# Patient Record
Sex: MALE | Race: BLACK OR AFRICAN AMERICAN | NOT HISPANIC OR LATINO | Employment: FULL TIME | URBAN - METROPOLITAN AREA
[De-identification: names, ages, dates, MRNs, and addresses within clinical notes are randomized per-mention and may not be internally consistent; named-entity substitution may affect disease eponyms.]

---

## 2018-04-20 ENCOUNTER — OFFICE VISIT (OUTPATIENT)
Dept: FAMILY MEDICINE CLINIC | Facility: CLINIC | Age: 28
End: 2018-04-20

## 2018-04-20 VITALS
TEMPERATURE: 97.9 F | WEIGHT: 315 LBS | HEIGHT: 69 IN | SYSTOLIC BLOOD PRESSURE: 110 MMHG | OXYGEN SATURATION: 98 % | HEART RATE: 66 BPM | DIASTOLIC BLOOD PRESSURE: 84 MMHG | BODY MASS INDEX: 46.65 KG/M2

## 2018-04-20 DIAGNOSIS — M75.82 ROTATOR CUFF TENDINITIS, LEFT: Primary | ICD-10-CM

## 2018-04-20 PROCEDURE — 99213 OFFICE O/P EST LOW 20 MIN: CPT | Performed by: FAMILY MEDICINE

## 2018-04-20 RX ORDER — CYCLOBENZAPRINE HCL 10 MG
10 TABLET ORAL
Qty: 10 TABLET | Refills: 1 | Status: SHIPPED | OUTPATIENT
Start: 2018-04-20 | End: 2018-05-24

## 2018-04-20 RX ORDER — MELOXICAM 15 MG/1
15 TABLET ORAL DAILY
Qty: 10 TABLET | Refills: 1 | Status: SHIPPED | OUTPATIENT
Start: 2018-04-20 | End: 2020-02-19

## 2018-04-20 NOTE — PROGRESS NOTES
"Subjective   Murphy Gutierrez is a 27 y.o. male.     History of Present Illness   The patient is here today with c/o left shoulder pain.    States he went to the ER and was diagnosed with a pulled muscle. Took all the antiinflammatory medication and muscle relaxers given and still having pain with lifting and driving.  Denies any chest pain or shortness of breath.        The following portions of the patient's history were reviewed and updated as appropriate: allergies, current medications, past social history and problem list.    Review of Systems   Respiratory: Negative for shortness of breath.    Cardiovascular: Negative for chest pain.   Musculoskeletal: Positive for arthralgias (left shoulder).       Objective   /84   Pulse 66   Temp 97.9 °F (36.6 °C)   Ht 175.3 cm (69\")   Wt (!) 165 kg (363 lb)   SpO2 98%   BMI 53.61 kg/m²   Physical Exam   Constitutional: He appears well-developed and well-nourished.   Musculoskeletal:   Left shoulder exam reveals tenderness at superior aspect of shoulder.  Pain with movement against resistance.  Normal range of motion without pain passively.   Nursing note and vitals reviewed.      Assessment/Plan   Problem List Items Addressed This Visit     None      Visit Diagnoses     Rotator cuff tendinitis, left    -  Primary              Drink plenty fluids.    Rx for Cyclobenzaprine 10 mg at bed time #10+1.  Rx for Meloxicam 15 mg daily #10+1.    Refer to Physical therapy.    Follow up in 4 weeks.                Scribed for Dr Mauricio Ugalde by Imani Arriaga CMA.          I, Mauricio Ugalde MD, personally performed the services described in this documentation, as scribed by Imani Arriaga in my presence, and is both accurate and complete.      "

## 2018-04-25 ENCOUNTER — TELEPHONE (OUTPATIENT)
Dept: FAMILY MEDICINE CLINIC | Facility: CLINIC | Age: 28
End: 2018-04-25

## 2018-04-25 NOTE — TELEPHONE ENCOUNTER
----- Message from Enrique Rush Rep sent at 4/25/2018  8:04 AM EDT -----  Regarding: SHOULDER PAIN  Contact: [phone]  PT IS HAVING PAIN IN HIS SHOULDER AND WAS SEEN FOR IT LAST WEEK. HE WOULD LIKE TO KNOW IF IT IS POSSIBLE TO GET A STATEMENT LIMITING HIS WORK AND HE CAN HAVE AN RX CALLED IN?

## 2018-05-24 ENCOUNTER — OFFICE VISIT (OUTPATIENT)
Dept: FAMILY MEDICINE CLINIC | Facility: CLINIC | Age: 28
End: 2018-05-24

## 2018-05-24 VITALS
WEIGHT: 315 LBS | OXYGEN SATURATION: 98 % | HEIGHT: 69 IN | TEMPERATURE: 97.2 F | BODY MASS INDEX: 46.65 KG/M2 | SYSTOLIC BLOOD PRESSURE: 152 MMHG | DIASTOLIC BLOOD PRESSURE: 88 MMHG | HEART RATE: 75 BPM

## 2018-05-24 DIAGNOSIS — S46.812D TRAPEZIUS STRAIN, LEFT, SUBSEQUENT ENCOUNTER: Primary | ICD-10-CM

## 2018-05-24 PROCEDURE — 99213 OFFICE O/P EST LOW 20 MIN: CPT | Performed by: FAMILY MEDICINE

## 2018-05-24 PROCEDURE — 20552 NJX 1/MLT TRIGGER POINT 1/2: CPT | Performed by: FAMILY MEDICINE

## 2018-05-24 RX ORDER — METHYLPREDNISOLONE ACETATE 40 MG/ML
40 INJECTION, SUSPENSION INTRA-ARTICULAR; INTRALESIONAL; INTRAMUSCULAR; SOFT TISSUE ONCE
Status: COMPLETED | OUTPATIENT
Start: 2018-05-24 | End: 2018-05-24

## 2018-05-24 RX ORDER — DIAZEPAM 5 MG/1
TABLET ORAL
Qty: 15 TABLET | Refills: 0
Start: 2018-05-24 | End: 2020-02-19

## 2018-05-24 RX ADMIN — METHYLPREDNISOLONE ACETATE 40 MG: 40 INJECTION, SUSPENSION INTRA-ARTICULAR; INTRALESIONAL; INTRAMUSCULAR; SOFT TISSUE at 18:15

## 2018-05-24 NOTE — PROGRESS NOTES
"Subjective   Murphy Gutierrez is a 27 y.o. male.     History of Present Illness   The patient is here for a follow up on Left shoulder pain.    States his shoulder pain has gotten worse. No some stiffness in his neck and radiates into left chest. Worse with picking up items , driving and pushing up with left arm. Has had some numbness and tingling.  He is using the Cyclobenzaprine at bedtime and the Meloxicam 15 mg in the mornings.  No able to do PT due to no insurance.    Denies any chest pain or shortness of breath.      The following portions of the patient's history were reviewed and updated as appropriate: allergies, current medications, past social history and problem list.    Review of Systems   Respiratory: Negative for shortness of breath.    Cardiovascular: Negative for chest pain.   Musculoskeletal: Positive for myalgias (left shoulder and arm.).       Objective   /88 (BP Location: Left arm, Patient Position: Sitting, Cuff Size: Adult)   Pulse 75   Temp 97.2 °F (36.2 °C) (Oral)   Ht 175.3 cm (69\")   Wt (!) 166 kg (365 lb)   SpO2 98%   BMI 53.90 kg/m²   Physical Exam   Constitutional: He appears well-developed and well-nourished.   Musculoskeletal:   There is tenderness and tightness of the left trapezius muscle.  There is a trigger point centrally and this is injected.  Tolerated.   Nursing note and vitals reviewed.      Assessment/Plan   Problem List Items Addressed This Visit     None      Visit Diagnoses     Trapezius strain, left, subsequent encounter    -  Primary        Inject Trigger Point, 1 or 2  Date/Time: 5/24/2018 4:30 PM  Performed by: SYMONE KHANNA  Authorized by: SYMONE KHANNA   Consent: Verbal consent obtained.  Consent given by: patient  Patient understanding: patient states understanding of the procedure being performed  Patient consent: the patient's understanding of the procedure matches consent given  Patient identity confirmed: verbally with patient  Local anesthesia " used: yes    Anesthesia:  Local anesthesia used: yes  Local Anesthetic: bupivacaine 0.5% without epinephrine    Sedation:  Patient sedated: no  Patient tolerance: Patient tolerated the procedure well with no immediate complications                Drink plenty fluids.    Continue medications as doing.    Written Rx for Diazepam 5 mg 1 early evening and 2 at bedtime. #15+0.    Written Rx for Cyclobenzaprine 10 mg to use after the Diazepam therapy.    Follow up as needed.            Scribed for Dr Mauricio Ugalde by Imani Arriaga CMA.          I, Mauricio Ugalde MD, personally performed the services described in this documentation, as scribed by Imani Arriaga in my presence, and is both accurate and complete.

## 2020-01-23 ENCOUNTER — OFFICE VISIT (OUTPATIENT)
Dept: FAMILY MEDICINE CLINIC | Facility: CLINIC | Age: 30
End: 2020-01-23

## 2020-01-23 VITALS
BODY MASS INDEX: 46.65 KG/M2 | RESPIRATION RATE: 16 BRPM | OXYGEN SATURATION: 94 % | SYSTOLIC BLOOD PRESSURE: 134 MMHG | WEIGHT: 315 LBS | HEART RATE: 99 BPM | DIASTOLIC BLOOD PRESSURE: 94 MMHG | HEIGHT: 69 IN | TEMPERATURE: 98.7 F

## 2020-01-23 DIAGNOSIS — J10.1 INFLUENZA A: Primary | ICD-10-CM

## 2020-01-23 LAB
EXPIRATION DATE: ABNORMAL
FLUAV AG NPH QL: POSITIVE
FLUBV AG NPH QL: NEGATIVE
INTERNAL CONTROL: ABNORMAL
Lab: ABNORMAL

## 2020-01-23 PROCEDURE — 99213 OFFICE O/P EST LOW 20 MIN: CPT | Performed by: FAMILY MEDICINE

## 2020-01-23 PROCEDURE — 87804 INFLUENZA ASSAY W/OPTIC: CPT | Performed by: FAMILY MEDICINE

## 2020-01-23 RX ORDER — OSELTAMIVIR PHOSPHATE 75 MG/1
75 CAPSULE ORAL 2 TIMES DAILY
Qty: 10 CAPSULE | Refills: 0 | Status: SHIPPED | OUTPATIENT
Start: 2020-01-23 | End: 2020-02-19

## 2020-01-23 NOTE — PROGRESS NOTES
"Subjective   Murphy Gutierrez is a 29 y.o. male seen today for Chills; Headache; and Cough (productive ).     Cough   This is a new problem. The current episode started in the past 7 days. The cough is productive of sputum. Associated symptoms include chills, a fever and headaches. Pertinent negatives include no chest pain or shortness of breath. The symptoms are aggravated by lying down and exercise. He has tried rest and OTC cough suppressant for the symptoms. The treatment provided no relief.        The following portions of the patient's history were reviewed and updated as appropriate: allergies, current medications, past social history and problem list.    Review of Systems   Constitutional: Positive for chills and fever.   Respiratory: Positive for cough. Negative for shortness of breath.    Cardiovascular: Negative for chest pain.   Neurological: Positive for headaches.       Objective   /94   Pulse 99   Temp 98.7 °F (37.1 °C)   Resp 16   Ht 175.3 cm (69\")   Wt (!) 192 kg (422 lb 9.6 oz)   SpO2 94%   BMI 62.41 kg/m²   Physical Exam   Constitutional: He appears well-developed and well-nourished.   Cardiovascular: Normal rate and regular rhythm.   Pulmonary/Chest: Effort normal and breath sounds normal.   Nursing note and vitals reviewed.      Assessment/Plan   Problem List Items Addressed This Visit     None      Visit Diagnoses     Influenza A    -  Primary    Relevant Medications    oseltamivir (TAMIFLU) 75 MG capsule    Other Relevant Orders    POCT Influenza A/B (Completed)              Drink plenty fluids.    Rx for Tamiflu 75 mg twice a day #10+0.    Follow up as needed.          Scribed for Dr Mauricio Ugalde by Imani Arriaga CMA.          I, Mauricio Ugalde MD, personally performed the services described in this documentation, as scribed by Imani Arriaga in my presence, and is both accurate and complete.        (Please note that portions of this note were completed with a voice recognition " program. Efforts were made to edit the dictations,but occasionally words are mis transcribed.)

## 2020-02-19 ENCOUNTER — OFFICE VISIT (OUTPATIENT)
Dept: FAMILY MEDICINE CLINIC | Facility: CLINIC | Age: 30
End: 2020-02-19

## 2020-02-19 VITALS
RESPIRATION RATE: 22 BRPM | HEIGHT: 69 IN | SYSTOLIC BLOOD PRESSURE: 140 MMHG | HEART RATE: 99 BPM | WEIGHT: 315 LBS | OXYGEN SATURATION: 98 % | TEMPERATURE: 97.5 F | DIASTOLIC BLOOD PRESSURE: 90 MMHG | BODY MASS INDEX: 46.65 KG/M2

## 2020-02-19 DIAGNOSIS — M10.9 GOUTY ARTHRITIS OF GREAT TOE: Primary | ICD-10-CM

## 2020-02-19 DIAGNOSIS — I10 ESSENTIAL HYPERTENSION: ICD-10-CM

## 2020-02-19 LAB
ALBUMIN SERPL-MCNC: 3.9 G/DL (ref 3.5–5.2)
ALBUMIN/GLOB SERPL: 1.1 G/DL
ALP SERPL-CCNC: 73 U/L (ref 39–117)
ALT SERPL W P-5'-P-CCNC: 24 U/L (ref 1–41)
ANION GAP SERPL CALCULATED.3IONS-SCNC: 11.9 MMOL/L (ref 5–15)
AST SERPL-CCNC: 15 U/L (ref 1–40)
BASOPHILS # BLD AUTO: 0.06 10*3/MM3 (ref 0–0.2)
BASOPHILS NFR BLD AUTO: 0.4 % (ref 0–1.5)
BILIRUB SERPL-MCNC: 0.3 MG/DL (ref 0.2–1.2)
BUN BLD-MCNC: 13 MG/DL (ref 6–20)
BUN/CREAT SERPL: 16.3 (ref 7–25)
CALCIUM SPEC-SCNC: 9.5 MG/DL (ref 8.6–10.5)
CHLORIDE SERPL-SCNC: 98 MMOL/L (ref 98–107)
CHOLEST SERPL-MCNC: 165 MG/DL (ref 0–200)
CO2 SERPL-SCNC: 28.1 MMOL/L (ref 22–29)
CREAT BLD-MCNC: 0.8 MG/DL (ref 0.76–1.27)
DEPRECATED RDW RBC AUTO: 36 FL (ref 37–54)
EOSINOPHIL # BLD AUTO: 0.37 10*3/MM3 (ref 0–0.4)
EOSINOPHIL NFR BLD AUTO: 2.4 % (ref 0.3–6.2)
ERYTHROCYTE [DISTWIDTH] IN BLOOD BY AUTOMATED COUNT: 13.6 % (ref 12.3–15.4)
GFR SERPL CREATININE-BSD FRML MDRD: 138 ML/MIN/1.73
GLOBULIN UR ELPH-MCNC: 3.7 GM/DL
GLUCOSE BLD-MCNC: 127 MG/DL (ref 65–99)
HCT VFR BLD AUTO: 42.2 % (ref 37.5–51)
HDLC SERPL-MCNC: 47 MG/DL (ref 40–60)
HGB BLD-MCNC: 12.6 G/DL (ref 13–17.7)
IMM GRANULOCYTES # BLD AUTO: 0.1 10*3/MM3 (ref 0–0.05)
IMM GRANULOCYTES NFR BLD AUTO: 0.7 % (ref 0–0.5)
LDLC SERPL CALC-MCNC: 103 MG/DL (ref 0–100)
LDLC/HDLC SERPL: 2.18 {RATIO}
LYMPHOCYTES # BLD AUTO: 2.32 10*3/MM3 (ref 0.7–3.1)
LYMPHOCYTES NFR BLD AUTO: 15.1 % (ref 19.6–45.3)
MCH RBC QN AUTO: 22.5 PG (ref 26.6–33)
MCHC RBC AUTO-ENTMCNC: 29.9 G/DL (ref 31.5–35.7)
MCV RBC AUTO: 75.4 FL (ref 79–97)
MONOCYTES # BLD AUTO: 0.75 10*3/MM3 (ref 0.1–0.9)
MONOCYTES NFR BLD AUTO: 4.9 % (ref 5–12)
NEUTROPHILS # BLD AUTO: 11.73 10*3/MM3 (ref 1.7–7)
NEUTROPHILS NFR BLD AUTO: 76.5 % (ref 42.7–76)
NRBC BLD AUTO-RTO: 0 /100 WBC (ref 0–0.2)
PLATELET # BLD AUTO: 285 10*3/MM3 (ref 140–450)
PMV BLD AUTO: 10.8 FL (ref 6–12)
POTASSIUM BLD-SCNC: 4.8 MMOL/L (ref 3.5–5.2)
PROT SERPL-MCNC: 7.6 G/DL (ref 6–8.5)
RBC # BLD AUTO: 5.6 10*6/MM3 (ref 4.14–5.8)
SODIUM BLD-SCNC: 138 MMOL/L (ref 136–145)
TRIGL SERPL-MCNC: 77 MG/DL (ref 0–150)
TSH SERPL DL<=0.05 MIU/L-ACNC: 4.05 UIU/ML (ref 0.27–4.2)
URATE SERPL-MCNC: 7.6 MG/DL (ref 3.4–7)
VLDLC SERPL-MCNC: 15.4 MG/DL (ref 5–40)
WBC NRBC COR # BLD: 15.33 10*3/MM3 (ref 3.4–10.8)

## 2020-02-19 PROCEDURE — 85025 COMPLETE CBC W/AUTO DIFF WBC: CPT | Performed by: FAMILY MEDICINE

## 2020-02-19 PROCEDURE — 99213 OFFICE O/P EST LOW 20 MIN: CPT | Performed by: FAMILY MEDICINE

## 2020-02-19 PROCEDURE — 80053 COMPREHEN METABOLIC PANEL: CPT | Performed by: FAMILY MEDICINE

## 2020-02-19 PROCEDURE — 80061 LIPID PANEL: CPT | Performed by: FAMILY MEDICINE

## 2020-02-19 PROCEDURE — 84443 ASSAY THYROID STIM HORMONE: CPT | Performed by: FAMILY MEDICINE

## 2020-02-19 PROCEDURE — 84550 ASSAY OF BLOOD/URIC ACID: CPT | Performed by: FAMILY MEDICINE

## 2020-02-19 RX ORDER — INDOMETHACIN 50 MG/1
50 CAPSULE ORAL 3 TIMES DAILY PRN
Qty: 10 CAPSULE | Refills: 1 | Status: SHIPPED | OUTPATIENT
Start: 2020-02-19

## 2020-02-19 RX ORDER — AMLODIPINE BESYLATE 2.5 MG/1
2.5 TABLET ORAL DAILY
Qty: 30 TABLET | Refills: 5 | Status: SHIPPED | OUTPATIENT
Start: 2020-02-19

## 2020-02-19 NOTE — PROGRESS NOTES
"Subjective   Murphy Gutierrez is a 29 y.o. male seen today for Toe Pain.     History of Present Illness   The patient is here with c/o left great toe pain and swelling.    States the pain is about a 6 on the pain score. Feels like the bone needs to pop.  Some redness and swelling. Hurts worse with walking and palpation.  Denies any fever, or chills.     BP today is 140/90.  Denies any chest pain or shortness of breath.  Also c/o a lot of headaches. About 1-2 times a week.      The following portions of the patient's history were reviewed and updated as appropriate: allergies, current medications, past social history and problem list.    Review of Systems   Respiratory: Negative for shortness of breath.    Cardiovascular: Negative for chest pain.   Musculoskeletal: Positive for arthralgias (left great toe).       Objective   /90   Pulse 99   Temp 97.5 °F (36.4 °C)   Resp 22   Ht 175.3 cm (69\")   Wt (!) 192 kg (423 lb)   SpO2 98%   BMI 62.47 kg/m²   Physical Exam   Constitutional: He appears well-developed and well-nourished.   Nursing note and vitals reviewed.      Assessment/Plan   Problem List Items Addressed This Visit     None      Visit Diagnoses     Gouty arthritis of great toe    -  Primary    Relevant Medications    indomethacin (INDOCIN) 50 MG capsule    Other Relevant Orders    Uric Acid (Completed)    CBC & Differential (Completed)    CBC Auto Differential (Completed)    Essential hypertension        Relevant Medications    amLODIPine (NORVASC) 2.5 MG tablet    Other Relevant Orders    CBC & Differential (Completed)    Comprehensive Metabolic Panel (Completed)    Lipid Panel (Completed)    TSH (Completed)    CBC Auto Differential (Completed)              Drink plenty fluids.    Avoid rich fatty meats.    Rx for Indomethacin 50 mg 3 times a day as needed #10+1.    Check a CBC,CMP,Lipids,Uric acid, and TSH. Report results by letter.    With regards to lab results, patient is instructed to call the " office if they have not received test results within 2 weeks time.    Follow up in 4 weeks for a physical.              Scribed for Dr Mauricio Ugalde by Imani Arriaga CMA.          I, Mauricio Ugalde MD, personally performed the services described in this documentation, as scribed by Imani Arriaga in my presence, and is both accurate and complete.        (Please note that portions of this note were completed with a voice recognition program. Efforts were made to edit the dictations,but occasionally words are mis transcribed.)

## 2020-05-18 ENCOUNTER — TELEPHONE (OUTPATIENT)
Dept: FAMILY MEDICINE CLINIC | Facility: CLINIC | Age: 30
End: 2020-05-18

## 2020-05-18 DIAGNOSIS — H65.00 ACUTE SEROUS OTITIS MEDIA, RECURRENCE NOT SPECIFIED, UNSPECIFIED LATERALITY: Primary | ICD-10-CM

## 2020-05-18 RX ORDER — AMOXICILLIN AND CLAVULANATE POTASSIUM 875; 125 MG/1; MG/1
1 TABLET, FILM COATED ORAL 2 TIMES DAILY
Qty: 20 TABLET | Refills: 0 | Status: SHIPPED | OUTPATIENT
Start: 2020-05-18

## 2020-05-18 NOTE — TELEPHONE ENCOUNTER
Please advise/Jeanie    I spoke to the patient's mother.  He is having ear pain.  They are using drops for external otitis but the pain persists.  He also has a draining abscess of his lower back.    We will place him on Augmentin 875 mg twice a day for 10 days.  Return to see me day after tomorrow.

## 2020-05-18 NOTE — TELEPHONE ENCOUNTER
PATIENTS MOM CALLED AND IS REQUESTING A CALL BACK IN REGARDS TO HER SONS EAR INFECTION. TO SEE IF SOMETHING COULD BE DONE BEFORE THE 20TH WHEN HIS APPOINTMENT IS.    PATIENTS MOM CALL BACK [phone]    PLEASE ADVISE

## 2020-05-20 ENCOUNTER — TELEPHONE (OUTPATIENT)
Dept: FAMILY MEDICINE CLINIC | Facility: CLINIC | Age: 30
End: 2020-05-20

## 2020-05-20 ENCOUNTER — OFFICE VISIT (OUTPATIENT)
Dept: FAMILY MEDICINE CLINIC | Facility: CLINIC | Age: 30
End: 2020-05-20

## 2020-05-20 VITALS
SYSTOLIC BLOOD PRESSURE: 132 MMHG | WEIGHT: 315 LBS | HEART RATE: 88 BPM | BODY MASS INDEX: 63.68 KG/M2 | DIASTOLIC BLOOD PRESSURE: 78 MMHG | TEMPERATURE: 96.3 F | RESPIRATION RATE: 20 BRPM

## 2020-05-20 DIAGNOSIS — H60.501 ACUTE OTITIS EXTERNA OF RIGHT EAR, UNSPECIFIED TYPE: Primary | ICD-10-CM

## 2020-05-20 DIAGNOSIS — H60.311 ACUTE DIFFUSE OTITIS EXTERNA OF RIGHT EAR: Primary | ICD-10-CM

## 2020-05-20 DIAGNOSIS — L02.92 FURUNCLE: ICD-10-CM

## 2020-05-20 DIAGNOSIS — R73.9 HYPERGLYCEMIA: ICD-10-CM

## 2020-05-20 DIAGNOSIS — H61.21 IMPACTED CERUMEN OF RIGHT EAR: ICD-10-CM

## 2020-05-20 DIAGNOSIS — D72.829 LEUKOCYTOSIS, UNSPECIFIED TYPE: ICD-10-CM

## 2020-05-20 LAB — HBA1C MFR BLD: 5.82 % (ref 4.8–5.6)

## 2020-05-20 PROCEDURE — 85025 COMPLETE CBC W/AUTO DIFF WBC: CPT | Performed by: FAMILY MEDICINE

## 2020-05-20 PROCEDURE — 99214 OFFICE O/P EST MOD 30 MIN: CPT | Performed by: FAMILY MEDICINE

## 2020-05-20 PROCEDURE — 83036 HEMOGLOBIN GLYCOSYLATED A1C: CPT | Performed by: FAMILY MEDICINE

## 2020-05-20 PROCEDURE — 85007 BL SMEAR W/DIFF WBC COUNT: CPT | Performed by: FAMILY MEDICINE

## 2020-05-20 RX ORDER — NEOMYCIN SULFATE, POLYMYXIN B SULFATE, HYDROCORTISONE 3.5; 10000; 1 MG/ML; [USP'U]/ML; MG/ML
3 SOLUTION/ DROPS AURICULAR (OTIC) 3 TIMES DAILY
Status: SHIPPED | OUTPATIENT
Start: 2020-05-20 | End: 2020-05-25

## 2020-05-20 NOTE — PROGRESS NOTES
Subjective   Murphy Gutierrez is a 29 y.o. male seen today for Earache (Right ear pain x 1-2wks) and Abscess (Not painful but has had it for 1 month. ).     Earache    There is pain in the right ear. This is a new problem. The current episode started 1 to 4 weeks ago (about 2 weeks.). The problem has been unchanged. There has been no fever. The pain is mild. Associated symptoms include hearing loss (right ear). Pertinent negatives include no sore throat. He has tried antibiotics for the symptoms.   Abscess   This is a recurrent problem. The current episode started more than 1 month ago. The problem has been rapidly improving (has drained a lot of pus.). Pertinent negatives include no change in bowel habit, chest pain, chills, fever or sore throat. Exacerbated by: sitting. He has tried nothing for the symptoms.          The following portions of the patient's history were reviewed and updated as appropriate: allergies, current medications, past social history and problem list.    Review of Systems   Constitutional: Negative for chills and fever.   HENT: Positive for ear pain (right) and hearing loss (right ear). Negative for sore throat.    Cardiovascular: Negative for chest pain.   Gastrointestinal: Negative for change in bowel habit.   Skin:        Furuncle of the right buttock         Objective   /78   Pulse 88   Temp 96.3 °F (35.7 °C)   Resp 20   Wt (!) 196 kg (431 lb 3.2 oz)   BMI 63.68 kg/m²   Physical Exam   Constitutional: He appears well-developed and well-nourished.   HENT:   Examination of the right ear canal reveals a cerumen impaction.  After irrigation of the ear canal there is some residual inflammation.  Eardrum itself is normal.   Skin:   Examination of the skin of the right buttock reveals a resolved boil.  There is no fluctuance or tenderness at this time.   Nursing note and vitals reviewed.      Assessment/Plan   Problem List Items Addressed This Visit     None      Visit Diagnoses      Acute otitis externa of right ear, unspecified type    -  Primary    Impacted cerumen of right ear        Furuncle        Hyperglycemia        Relevant Orders    Hemoglobin A1c (Completed)    Leukocytosis, unspecified type        Relevant Orders    CBC & Differential (Completed)    CBC Auto Differential (Completed)    Manual Differential (Completed)              Drink plenty fluids.  Work on diet and weight loss.  Cut back on carbohydrates such as breads, potatoes, pastas and desserts.  Eat more  Fruits, vegetables, and lean meats such a fish and chicken.    Discontinue the Debrox drops.    Finish out the Augmentin therapy.    Rx for Ciprofloxacin Otic drops. 3 drops in right ear twice a day for 5 days. #10 ML +0.    Cleanse the buttock area well daily with antibacterial soap.    Check an A1C and CBC.    Follow up in 3 months. Sooner if needed.          Scribed for Dr Mauricio Ugalde by Imani Arriaga CMA.          I, Mauricio Ugalde MD, personally performed the services described in this documentation, as scribed by Imani Arriaga in my presence, and is both accurate and complete.        (Please note that portions of this note were completed with a voice recognition program. Efforts were made to edit the dictations,but occasionally words are mis transcribed.)

## 2020-05-20 NOTE — TELEPHONE ENCOUNTER
I will change the prescription from Cipro HC otic suspension to generic Cortisporin otic suspension.

## 2020-05-20 NOTE — TELEPHONE ENCOUNTER
PT CALLED AND STATED PRESCRIPTION WAS TO EXPENSIVE AND IS REQUESTING AN ALTERNATIVE FOR:    ciprofloxacin-hydrocortisone (CIPRO HC OTIC) 0.2-1 % otic suspension    [id number] - [place], [place] - [address] AT [facility] [address] . [facility] [phone] [facility] [phone] FX

## 2020-05-21 LAB
ANISOCYTOSIS BLD QL: ABNORMAL
BASOPHILS # BLD MANUAL: 0.13 10*3/MM3 (ref 0–0.2)
BASOPHILS NFR BLD AUTO: 1 % (ref 0–1.5)
DEPRECATED RDW RBC AUTO: 35.9 FL (ref 37–54)
EOSINOPHIL # BLD MANUAL: 0.93 10*3/MM3 (ref 0–0.4)
EOSINOPHIL NFR BLD MANUAL: 7.1 % (ref 0.3–6.2)
ERYTHROCYTE [DISTWIDTH] IN BLOOD BY AUTOMATED COUNT: 13.7 % (ref 12.3–15.4)
HCT VFR BLD AUTO: 40.7 % (ref 37.5–51)
HGB BLD-MCNC: 12.6 G/DL (ref 13–17.7)
LYMPHOCYTES # BLD MANUAL: 1.19 10*3/MM3 (ref 0.7–3.1)
LYMPHOCYTES NFR BLD MANUAL: 10.1 % (ref 5–12)
LYMPHOCYTES NFR BLD MANUAL: 9.1 % (ref 19.6–45.3)
MCH RBC QN AUTO: 22.9 PG (ref 26.6–33)
MCHC RBC AUTO-ENTMCNC: 31 G/DL (ref 31.5–35.7)
MCV RBC AUTO: 74 FL (ref 79–97)
MICROCYTES BLD QL: ABNORMAL
MONOCYTES # BLD AUTO: 1.32 10*3/MM3 (ref 0.1–0.9)
NEUTROPHILS # BLD AUTO: 9.47 10*3/MM3 (ref 1.7–7)
NEUTROPHILS NFR BLD MANUAL: 72.7 % (ref 42.7–76)
PLAT MORPH BLD: NORMAL
PLATELET # BLD AUTO: 332 10*3/MM3 (ref 140–450)
PMV BLD AUTO: 10.9 FL (ref 6–12)
RBC # BLD AUTO: 5.5 10*6/MM3 (ref 4.14–5.8)
WBC MORPH BLD: NORMAL
WBC NRBC COR # BLD: 13.03 10*3/MM3 (ref 3.4–10.8)

## 2024-12-18 ENCOUNTER — LAB (OUTPATIENT)
Age: 34
End: 2024-12-18
Payer: MEDICAID

## 2024-12-18 ENCOUNTER — OFFICE VISIT (OUTPATIENT)
Age: 34
End: 2024-12-18
Payer: MEDICAID

## 2024-12-18 ENCOUNTER — NURSE TRIAGE (OUTPATIENT)
Dept: CALL CENTER | Facility: HOSPITAL | Age: 34
End: 2024-12-18
Payer: MEDICAID

## 2024-12-18 VITALS
OXYGEN SATURATION: 97 % | HEIGHT: 68 IN | HEART RATE: 101 BPM | RESPIRATION RATE: 20 BRPM | SYSTOLIC BLOOD PRESSURE: 148 MMHG | WEIGHT: 315 LBS | DIASTOLIC BLOOD PRESSURE: 98 MMHG | BODY MASS INDEX: 47.74 KG/M2

## 2024-12-18 DIAGNOSIS — M1A.9XX0 CHRONIC GOUT INVOLVING TOE WITHOUT TOPHUS, UNSPECIFIED CAUSE, UNSPECIFIED LATERALITY: ICD-10-CM

## 2024-12-18 DIAGNOSIS — D64.9 ANEMIA, UNSPECIFIED TYPE: ICD-10-CM

## 2024-12-18 DIAGNOSIS — E78.2 MIXED HYPERLIPIDEMIA: ICD-10-CM

## 2024-12-18 DIAGNOSIS — R79.9 ABNORMAL BLOOD SMEAR: ICD-10-CM

## 2024-12-18 DIAGNOSIS — R06.83 SNORING: Primary | ICD-10-CM

## 2024-12-18 DIAGNOSIS — R73.9 HYPERGLYCEMIA: ICD-10-CM

## 2024-12-18 DIAGNOSIS — E66.01 MORBID (SEVERE) OBESITY DUE TO EXCESS CALORIES: ICD-10-CM

## 2024-12-18 DIAGNOSIS — I10 ESSENTIAL HYPERTENSION: ICD-10-CM

## 2024-12-18 PROCEDURE — 80061 LIPID PANEL: CPT | Performed by: STUDENT IN AN ORGANIZED HEALTH CARE EDUCATION/TRAINING PROGRAM

## 2024-12-18 PROCEDURE — 1160F RVW MEDS BY RX/DR IN RCRD: CPT | Performed by: STUDENT IN AN ORGANIZED HEALTH CARE EDUCATION/TRAINING PROGRAM

## 2024-12-18 PROCEDURE — 99204 OFFICE O/P NEW MOD 45 MIN: CPT | Performed by: STUDENT IN AN ORGANIZED HEALTH CARE EDUCATION/TRAINING PROGRAM

## 2024-12-18 PROCEDURE — 1125F AMNT PAIN NOTED PAIN PRSNT: CPT | Performed by: STUDENT IN AN ORGANIZED HEALTH CARE EDUCATION/TRAINING PROGRAM

## 2024-12-18 PROCEDURE — 83036 HEMOGLOBIN GLYCOSYLATED A1C: CPT | Performed by: STUDENT IN AN ORGANIZED HEALTH CARE EDUCATION/TRAINING PROGRAM

## 2024-12-18 PROCEDURE — 36415 COLL VENOUS BLD VENIPUNCTURE: CPT | Performed by: STUDENT IN AN ORGANIZED HEALTH CARE EDUCATION/TRAINING PROGRAM

## 2024-12-18 PROCEDURE — 84550 ASSAY OF BLOOD/URIC ACID: CPT | Performed by: STUDENT IN AN ORGANIZED HEALTH CARE EDUCATION/TRAINING PROGRAM

## 2024-12-18 PROCEDURE — 84466 ASSAY OF TRANSFERRIN: CPT | Performed by: STUDENT IN AN ORGANIZED HEALTH CARE EDUCATION/TRAINING PROGRAM

## 2024-12-18 PROCEDURE — 1159F MED LIST DOCD IN RCRD: CPT | Performed by: STUDENT IN AN ORGANIZED HEALTH CARE EDUCATION/TRAINING PROGRAM

## 2024-12-18 PROCEDURE — 85007 BL SMEAR W/DIFF WBC COUNT: CPT | Performed by: STUDENT IN AN ORGANIZED HEALTH CARE EDUCATION/TRAINING PROGRAM

## 2024-12-18 PROCEDURE — 82728 ASSAY OF FERRITIN: CPT | Performed by: STUDENT IN AN ORGANIZED HEALTH CARE EDUCATION/TRAINING PROGRAM

## 2024-12-18 PROCEDURE — 83540 ASSAY OF IRON: CPT | Performed by: STUDENT IN AN ORGANIZED HEALTH CARE EDUCATION/TRAINING PROGRAM

## 2024-12-18 PROCEDURE — 80050 GENERAL HEALTH PANEL: CPT | Performed by: STUDENT IN AN ORGANIZED HEALTH CARE EDUCATION/TRAINING PROGRAM

## 2024-12-18 RX ORDER — LOSARTAN POTASSIUM 50 MG/1
50 TABLET ORAL DAILY
Qty: 90 TABLET | Refills: 1 | Status: SHIPPED | OUTPATIENT
Start: 2024-12-18

## 2024-12-18 RX ORDER — PANTOPRAZOLE SODIUM 40 MG/1
40 TABLET, DELAYED RELEASE ORAL
Qty: 90 TABLET | Refills: 3 | Status: SHIPPED | OUTPATIENT
Start: 2024-12-18

## 2024-12-18 NOTE — TELEPHONE ENCOUNTER
"Call transferred from the HUB. Caller states he is scheduled for a new pt apt on Friday. He went to a physical for the [employer] yesterday and they told him his BP was elevated. He is not sure of the numbers. Today he took his BP on a home wrist cuff and the reading was 178/124. He is asymptomatic except for a slight HA. The patient is calling today to see if he should just keep his apt? The call is warm transferred to the office. They state they will try to move his apt up.  Reason for Disposition   Systolic BP >= 200 OR Diastolic >= 120 and having NO cardiac or neurologic symptoms    Additional Information   Negative: Sounds like a life-threatening emergency to the triager   Negative: Symptom is main concern (e.g., headache, chest pain)   Negative: Low blood pressure is main concern   Negative: Systolic BP >= 160 OR Diastolic >= 100, and any cardiac (e.g., breathing difficulty, chest pain) or neurologic symptoms (e.g., new-onset blurred or double vision)   Negative: Pregnant 20 or more weeks (or postpartum < 6 weeks) with new hand or face swelling   Negative: Pregnant 20 or more weeks (or postpartum < 6 weeks) and Systolic BP >= 160 OR Diastolic >= 110   Negative: Patient sounds very sick or weak to the triager    Answer Assessment - Initial Assessment Questions  1. BLOOD PRESSURE: \"What is the blood pressure?\" \"Did you take at least two measurements 5 minutes apart?\"      178/124  2. ONSET: \"When did you take your blood pressure?\"      This am  3. HOW: \"How did you take your blood pressure?\" (e.g., automatic home BP monitor, visiting nurse)      Wrist cuff  4. HISTORY: \"Do you have a history of high blood pressure?\"      no  5. MEDICINES: \"Are you taking any medicines for blood pressure?\" \"Have you missed any doses recently?\"      no  6. OTHER SYMPTOMS: \"Do you have any symptoms?\" (e.g., blurred vision, chest pain, difficulty breathing, headache, weakness)      headache  7. PREGNANCY: \"Is there any chance you are " "pregnant?\" \"When was your last menstrual period?\"      no    Protocols used: Blood Pressure - High-ADULT-[place]    "

## 2024-12-19 ENCOUNTER — TELEPHONE (OUTPATIENT)
Age: 34
End: 2024-12-19
Payer: MEDICAID

## 2024-12-19 LAB
ALBUMIN SERPL-MCNC: 3.5 G/DL (ref 3.5–5.2)
ALBUMIN/GLOB SERPL: 1.1 G/DL
ALP SERPL-CCNC: 73 U/L (ref 39–117)
ALT SERPL W P-5'-P-CCNC: 22 U/L (ref 1–41)
ANION GAP SERPL CALCULATED.3IONS-SCNC: 9 MMOL/L (ref 5–15)
ANISOCYTOSIS BLD QL: ABNORMAL
AST SERPL-CCNC: 19 U/L (ref 1–40)
BASOPHILS # BLD MANUAL: 0.14 10*3/MM3 (ref 0–0.2)
BASOPHILS NFR BLD MANUAL: 1.1 % (ref 0–1.5)
BILIRUB SERPL-MCNC: 0.2 MG/DL (ref 0–1.2)
BUN SERPL-MCNC: 11 MG/DL (ref 6–20)
BUN/CREAT SERPL: 11.7 (ref 7–25)
CALCIUM SPEC-SCNC: 8.7 MG/DL (ref 8.6–10.5)
CHLORIDE SERPL-SCNC: 103 MMOL/L (ref 98–107)
CHOLEST SERPL-MCNC: 172 MG/DL (ref 0–200)
CO2 SERPL-SCNC: 25 MMOL/L (ref 22–29)
CREAT SERPL-MCNC: 0.94 MG/DL (ref 0.76–1.27)
DEPRECATED RDW RBC AUTO: 36.8 FL (ref 37–54)
EGFRCR SERPLBLD CKD-EPI 2021: 109.1 ML/MIN/1.73
EOSINOPHIL # BLD MANUAL: 0.39 10*3/MM3 (ref 0–0.4)
EOSINOPHIL NFR BLD MANUAL: 3.2 % (ref 0.3–6.2)
ERYTHROCYTE [DISTWIDTH] IN BLOOD BY AUTOMATED COUNT: 14 % (ref 12.3–15.4)
FERRITIN SERPL-MCNC: 93.9 NG/ML (ref 30–400)
GLOBULIN UR ELPH-MCNC: 3.2 GM/DL
GLUCOSE SERPL-MCNC: 168 MG/DL (ref 65–99)
HBA1C MFR BLD: 6.2 % (ref 4.8–5.6)
HCT VFR BLD AUTO: 41.6 % (ref 37.5–51)
HDLC SERPL-MCNC: 44 MG/DL (ref 40–60)
HGB BLD-MCNC: 12.5 G/DL (ref 13–17.7)
IRON 24H UR-MRATE: 37 MCG/DL (ref 59–158)
IRON SATN MFR SERPL: 10 % (ref 20–50)
LDLC SERPL CALC-MCNC: 100 MG/DL (ref 0–100)
LDLC/HDLC SERPL: 2.18 {RATIO}
LYMPHOCYTES # BLD MANUAL: 3.18 10*3/MM3 (ref 0.7–3.1)
LYMPHOCYTES NFR BLD MANUAL: 4.3 % (ref 5–12)
MCH RBC QN AUTO: 22.3 PG (ref 26.6–33)
MCHC RBC AUTO-ENTMCNC: 30 G/DL (ref 31.5–35.7)
MCV RBC AUTO: 74.3 FL (ref 79–97)
MICROCYTES BLD QL: ABNORMAL
MONOCYTES # BLD: 0.53 10*3/MM3 (ref 0.1–0.9)
NEUTROPHILS # BLD AUTO: 8.1 10*3/MM3 (ref 1.7–7)
NEUTROPHILS NFR BLD MANUAL: 65.6 % (ref 42.7–76)
PLAT MORPH BLD: NORMAL
PLATELET # BLD AUTO: 220 10*3/MM3 (ref 140–450)
PMV BLD AUTO: 11.1 FL (ref 6–12)
POTASSIUM SERPL-SCNC: 4.4 MMOL/L (ref 3.5–5.2)
PROT SERPL-MCNC: 6.7 G/DL (ref 6–8.5)
RBC # BLD AUTO: 5.6 10*6/MM3 (ref 4.14–5.8)
SMUDGE CELLS BLD QL SMEAR: ABNORMAL
SODIUM SERPL-SCNC: 137 MMOL/L (ref 136–145)
TIBC SERPL-MCNC: 358 MCG/DL (ref 298–536)
TRANSFERRIN SERPL-MCNC: 240 MG/DL (ref 200–360)
TRIGL SERPL-MCNC: 161 MG/DL (ref 0–150)
TSH SERPL DL<=0.05 MIU/L-ACNC: 2.97 UIU/ML (ref 0.27–4.2)
URATE SERPL-MCNC: 8 MG/DL (ref 3.4–7)
VARIANT LYMPHS NFR BLD MANUAL: 25.8 % (ref 19.6–45.3)
VLDLC SERPL-MCNC: 28 MG/DL (ref 5–40)
WBC NRBC COR # BLD AUTO: 12.34 10*3/MM3 (ref 3.4–10.8)

## 2024-12-19 RX ORDER — ALLOPURINOL 100 MG/1
100 TABLET ORAL DAILY
Qty: 90 TABLET | Refills: 2 | Status: SHIPPED | OUTPATIENT
Start: 2024-12-19

## 2024-12-19 RX ORDER — COLCHICINE 0.6 MG/1
0.6 TABLET ORAL DAILY
Qty: 90 TABLET | Refills: 0 | Status: SHIPPED | OUTPATIENT
Start: 2024-12-19 | End: 2025-03-19

## 2024-12-19 RX ORDER — FERROUS SULFATE 324(65)MG
324 TABLET, DELAYED RELEASE (ENTERIC COATED) ORAL
Qty: 30 TABLET | Refills: 2 | Status: SHIPPED | OUTPATIENT
Start: 2024-12-19

## 2024-12-19 NOTE — PROGRESS NOTES
Office Note     Name: Murphy Gutierrez    : 1990     MRN: [id number]     Chief Complaint  Hypertension, Annual Exam, Gout, Obesity, sleep apena, and Heartburn    Subjective     History of Present Illness:  Murphy Gutierrez is a 33 y.o. male who presents today for initial visit to establish care.  He is getting a DOT physical and is mainly here to have his hypertension addressed.  He has uncontrolled HTN and is not on any medication for this. He also has concern for sleep apnea and request for referral to bariatric surgery.  Has prediabetes, gout with frequent flares. Has also had a chronically elevated leukocyte count. No other concerns at this time.       Past Medical History: History reviewed. No pertinent past medical history.    Past Surgical History:   Past Surgical History:   Procedure Laterality Date    NO PAST SURGERIES         Immunizations:   Immunization History   Administered Date(s) Administered    Influenza, Unspecified 10/18/2017    PPD Test 2008        Medications:     Current Outpatient Medications:     amLODIPine (NORVASC) 2.5 MG tablet, Take 1 tablet by mouth Daily. (Patient not taking: Reported on 2024), Disp: 30 tablet, Rfl: 5    amoxicillin-clavulanate (AUGMENTIN) 875-125 MG per tablet, Take 1 tablet by mouth 2 (Two) Times a Day. (Patient not taking: Reported on 2024), Disp: 20 tablet, Rfl: 0    indomethacin (INDOCIN) 50 MG capsule, Take 1 capsule by mouth 3 (Three) Times a Day As Needed for Mild Pain . (Patient not taking: Reported on 2024), Disp: 10 capsule, Rfl: 1    losartan (COZAAR) 50 MG tablet, Take 1 tablet by mouth Daily., Disp: 90 tablet, Rfl: 1    pantoprazole (PROTONIX) 40 MG EC tablet, Take 1 tablet by mouth Every Morning Before Breakfast., Disp: 90 tablet, Rfl: 3    Allergies:   No Known Allergies    Family History:   Family History   Problem Relation Age of Onset    Hypertension Mother     Diabetes Mother     Hypertension Father     Diabetes Father   "      Social History:   Social History     Socioeconomic History    Marital status: Single   Tobacco Use    Smoking status: Some Days     Current packs/day: 0.25     Average packs/day: 0.3 packs/day for 17.0 years (4.2 ttl pk-yrs)     Types: Cigarettes, Cigars     Start date: 2008    Smokeless tobacco: Never   Vaping Use    Vaping status: Never Used   Substance and Sexual Activity    Alcohol use: No    Drug use: Not Currently     Types: Marijuana     Comment: occasionally    Sexual activity: Yes     Partners: Female         Objective     Vital Signs  /98 (BP Location: Left arm, Patient Position: Sitting, Cuff Size: Large Adult)   Pulse 101   Resp 20   Ht 173.3 cm (68.23\")   Wt (!) 196 kg (432 lb)   SpO2 97%   BMI 65.25 kg/m²   Estimated body mass index is 65.25 kg/m² as calculated from the following:    Height as of this encounter: 173.3 cm (68.23\").    Weight as of this encounter: 196 kg (432 lb).            Physical Exam  Constitutional:       General: He is not in acute distress.     Appearance: He is not toxic-appearing.   Cardiovascular:      Rate and Rhythm: Normal rate and regular rhythm.      Heart sounds: No murmur heard.     No friction rub. No gallop.   Pulmonary:      Effort: Pulmonary effort is normal.      Breath sounds: Normal breath sounds.   Abdominal:      General: Abdomen is flat. There is no distension.   Skin:     General: Skin is warm and dry.   Neurological:      Mental Status: He is alert.   Psychiatric:         Mood and Affect: Mood normal.         Behavior: Behavior normal.          Assessment and Plan     1. Snoring  Snoring, unrestorative sleep, witnessed apneic episodes, and comorbid hypertension raise concern for sleep apnea  -Order sleep study  - Home Sleep Study; Future    2. Mixed hyperlipidemia  Check lipids  - Lipid Panel; Future    3. Hyperglycemia  Prediabetic, check a1c  - Hemoglobin A1c; Future    4. Essential hypertension  Chronic uncontrolled  -Rx losartan 50 mg " daily  -Advised to monitor bp  -Check labs  - Comprehensive Metabolic Panel; Future  - CBC Auto Differential; Future  - TSH Rfx On Abnormal To Free T4; Future    5. Chronic gout involving toe without tophus, unspecified cause, unspecified laterality  Chronic uncontrolled  -Check uric acid and start allopurinol if needed  - Uric acid; Future    6. Morbid (severe) obesity due to excess calories  Chronic uncontrolled  -BMI 65.25 with comorbid HTN and prediabetes  - Ambulatory Referral to Bariatric Surgery    7. Abnormal blood smear  P smear with smudge cells  -Refer to heme onc given this and persistent leukocytosis  - Ambulatory Referral to Hematology / Oncology    8. Anemia, unspecified type  Check iron and ferritin  - Iron Profile; Future  - Ferritin; Future       Counseling was given to patient for the following topics: instructions for management.    Follow Up  Return in about 1 week (around 12/25/2024) for Annual physical.    Navid Corona MD  [facility]  [address] [id number]  [phone]

## 2024-12-19 NOTE — TELEPHONE ENCOUNTER
For Sleep Med referral:    **ACTION NEEDED: please add additional signs and symptoms of yoel such as witnessed apneas, excessive snoring, excessive sleepiness that interfers with daily activity, morning headaches, etc.

## 2024-12-27 ENCOUNTER — OFFICE VISIT (OUTPATIENT)
Age: 34
End: 2024-12-27
Payer: MEDICAID

## 2024-12-27 ENCOUNTER — LAB (OUTPATIENT)
Age: 34
End: 2024-12-27
Payer: MEDICAID

## 2024-12-27 VITALS
OXYGEN SATURATION: 97 % | SYSTOLIC BLOOD PRESSURE: 152 MMHG | HEART RATE: 100 BPM | WEIGHT: 315 LBS | BODY MASS INDEX: 47.74 KG/M2 | HEIGHT: 68 IN | DIASTOLIC BLOOD PRESSURE: 72 MMHG

## 2024-12-27 DIAGNOSIS — Z11.3 SCREENING EXAMINATION FOR STI: ICD-10-CM

## 2024-12-27 DIAGNOSIS — I10 ESSENTIAL HYPERTENSION: ICD-10-CM

## 2024-12-27 DIAGNOSIS — Z00.00 HEALTHCARE MAINTENANCE: Primary | ICD-10-CM

## 2024-12-27 DIAGNOSIS — R79.9 ABNORMAL BLOOD SMEAR: ICD-10-CM

## 2024-12-27 DIAGNOSIS — A59.9 TRICHOMONIASIS: ICD-10-CM

## 2024-12-27 DIAGNOSIS — M1A.9XX0 CHRONIC GOUT INVOLVING TOE WITHOUT TOPHUS, UNSPECIFIED CAUSE, UNSPECIFIED LATERALITY: ICD-10-CM

## 2024-12-27 DIAGNOSIS — E61.1 IRON DEFICIENCY: ICD-10-CM

## 2024-12-27 LAB
ALBUMIN SERPL-MCNC: 3.6 G/DL (ref 3.5–5.2)
ALBUMIN/GLOB SERPL: 1 G/DL
ALP SERPL-CCNC: 74 U/L (ref 39–117)
ALT SERPL W P-5'-P-CCNC: 24 U/L (ref 1–41)
ANION GAP SERPL CALCULATED.3IONS-SCNC: 11 MMOL/L (ref 5–15)
AST SERPL-CCNC: 23 U/L (ref 1–40)
BILIRUB SERPL-MCNC: 0.2 MG/DL (ref 0–1.2)
BUN SERPL-MCNC: 15 MG/DL (ref 6–20)
BUN/CREAT SERPL: 14.4 (ref 7–25)
CALCIUM SPEC-SCNC: 9 MG/DL (ref 8.6–10.5)
CHLORIDE SERPL-SCNC: 102 MMOL/L (ref 98–107)
CO2 SERPL-SCNC: 25 MMOL/L (ref 22–29)
CREAT SERPL-MCNC: 1.04 MG/DL (ref 0.76–1.27)
EGFRCR SERPLBLD CKD-EPI 2021: 96 ML/MIN/1.73
GLOBULIN UR ELPH-MCNC: 3.5 GM/DL
GLUCOSE SERPL-MCNC: 134 MG/DL (ref 65–99)
HIV 1+2 AB+HIV1 P24 AG SERPL QL IA: NORMAL
POTASSIUM SERPL-SCNC: 4.3 MMOL/L (ref 3.5–5.2)
PROT SERPL-MCNC: 7.1 G/DL (ref 6–8.5)
SODIUM SERPL-SCNC: 138 MMOL/L (ref 136–145)

## 2024-12-27 PROCEDURE — G0432 EIA HIV-1/HIV-2 SCREEN: HCPCS | Performed by: STUDENT IN AN ORGANIZED HEALTH CARE EDUCATION/TRAINING PROGRAM

## 2024-12-27 PROCEDURE — 99395 PREV VISIT EST AGE 18-39: CPT | Performed by: STUDENT IN AN ORGANIZED HEALTH CARE EDUCATION/TRAINING PROGRAM

## 2024-12-27 PROCEDURE — 80053 COMPREHEN METABOLIC PANEL: CPT | Performed by: STUDENT IN AN ORGANIZED HEALTH CARE EDUCATION/TRAINING PROGRAM

## 2024-12-27 PROCEDURE — 2014F MENTAL STATUS ASSESS: CPT | Performed by: STUDENT IN AN ORGANIZED HEALTH CARE EDUCATION/TRAINING PROGRAM

## 2024-12-27 PROCEDURE — 1159F MED LIST DOCD IN RCRD: CPT | Performed by: STUDENT IN AN ORGANIZED HEALTH CARE EDUCATION/TRAINING PROGRAM

## 2024-12-27 PROCEDURE — 1160F RVW MEDS BY RX/DR IN RCRD: CPT | Performed by: STUDENT IN AN ORGANIZED HEALTH CARE EDUCATION/TRAINING PROGRAM

## 2024-12-27 PROCEDURE — 3078F DIAST BP <80 MM HG: CPT | Performed by: STUDENT IN AN ORGANIZED HEALTH CARE EDUCATION/TRAINING PROGRAM

## 2024-12-27 PROCEDURE — 1125F AMNT PAIN NOTED PAIN PRSNT: CPT | Performed by: STUDENT IN AN ORGANIZED HEALTH CARE EDUCATION/TRAINING PROGRAM

## 2024-12-27 PROCEDURE — 36415 COLL VENOUS BLD VENIPUNCTURE: CPT | Performed by: STUDENT IN AN ORGANIZED HEALTH CARE EDUCATION/TRAINING PROGRAM

## 2024-12-27 PROCEDURE — 86592 SYPHILIS TEST NON-TREP QUAL: CPT | Performed by: STUDENT IN AN ORGANIZED HEALTH CARE EDUCATION/TRAINING PROGRAM

## 2024-12-27 PROCEDURE — 3077F SYST BP >= 140 MM HG: CPT | Performed by: STUDENT IN AN ORGANIZED HEALTH CARE EDUCATION/TRAINING PROGRAM

## 2024-12-27 RX ORDER — LOSARTAN POTASSIUM 100 MG/1
100 TABLET ORAL DAILY
Qty: 90 TABLET | Refills: 2 | Status: SHIPPED | OUTPATIENT
Start: 2024-12-27

## 2024-12-27 NOTE — PROGRESS NOTES
Office Note     Name: Murphy Gutierrez    : 1990     MRN: [id number]     Chief Complaint  Annual Exam and Hypertension    Subjective     History of Present Illness:  Murphy Gutierrez is a 34 y.o. male who presents today for annual health maintenance examination.  We are also following up on his chronic condition of hypertension.  He has no major health concerns.  Does continue to smoke black and milds.  Home blood pressures have been slightly elevated in the 140s to low 150s systolic but are improved on the losartan.    Family history: Colon cancer in an aunt during their early 40s as well as another second-degree relative in their 60s to 70s    Social history: Planning to work for [employer] as a , smokes Black and milds, no other substance use    Past Medical History: History reviewed. No pertinent past medical history.    Past Surgical History:   Past Surgical History:   Procedure Laterality Date    NO PAST SURGERIES         Immunizations:   Immunization History   Administered Date(s) Administered    Influenza, Unspecified 10/18/2017    PPD Test 2008        Medications:     Current Outpatient Medications:     allopurinol (Zyloprim) 100 MG tablet, Take 1 tablet by mouth Daily., Disp: 90 tablet, Rfl: 2    colchicine 0.6 MG tablet, Take 1 tablet by mouth Daily for 90 days. Stop after 90 days.  Change to every other day if you develop loose stools., Disp: 90 tablet, Rfl: 0    ferrous sulfate 324 (65 Fe) MG tablet delayed-release EC tablet, Take 1 tablet by mouth Daily With Breakfast., Disp: 30 tablet, Rfl: 2    losartan (COZAAR) 100 MG tablet, Take 1 tablet by mouth Daily., Disp: 90 tablet, Rfl: 2    pantoprazole (PROTONIX) 40 MG EC tablet, Take 1 tablet by mouth Every Morning Before Breakfast., Disp: 90 tablet, Rfl: 3    Allergies:   No Known Allergies    Family History:   Family History   Problem Relation Age of Onset    Hypertension Mother     Diabetes Mother     Hypertension Father     Diabetes  "Father        Social History:   Social History     Socioeconomic History    Marital status: Single   Tobacco Use    Smoking status: Some Days     Current packs/day: 0.25     Average packs/day: 0.3 packs/day for 17.0 years (4.2 ttl pk-yrs)     Types: Cigarettes, Cigars     Start date: 2008    Smokeless tobacco: Never   Vaping Use    Vaping status: Never Used   Substance and Sexual Activity    Alcohol use: No    Drug use: Not Currently     Types: Marijuana     Comment: occasionally    Sexual activity: Yes     Partners: Female         Objective     Vital Signs  /72 (BP Location: Left arm, Patient Position: Sitting, Cuff Size: Large Adult)   Pulse 100   Ht 173.3 cm (68.23\")   Wt (!) 197 kg (433 lb 6.4 oz)   SpO2 97%   BMI 65.46 kg/m²   Estimated body mass index is 65.46 kg/m² as calculated from the following:    Height as of this encounter: 173.3 cm (68.23\").    Weight as of this encounter: 197 kg (433 lb 6.4 oz).            Physical Exam  Constitutional:       General: He is not in acute distress.     Appearance: He is not toxic-appearing.   Cardiovascular:      Rate and Rhythm: Normal rate and regular rhythm.      Heart sounds: No murmur heard.     No friction rub. No gallop.   Pulmonary:      Effort: Pulmonary effort is normal.      Breath sounds: Normal breath sounds.   Abdominal:      General: Abdomen is flat. There is no distension.   Skin:     General: Skin is warm and dry.   Neurological:      Mental Status: He is alert.   Psychiatric:         Mood and Affect: Mood normal.         Behavior: Behavior normal.          Assessment and Plan     1. Screening examination for STI  Screen for STI today with urine and blood  - HIV-1 / O / 2 Ag / Antibody; Future  - RPR Qualitative with Reflex to Quant; Future  - Chlamydia trachomatis, Neisseria gonorrhoeae, Trichomonas vaginalis, PCR - Urine, Urine, Clean Catch; Future    2. Essential hypertension  Chronic, uncontrolled  -Check CMP today, increase losartan to " 100 mg  - Comprehensive Metabolic Panel; Future  - losartan (COZAAR) 100 MG tablet; Take 1 tablet by mouth Daily.  Dispense: 90 tablet; Refill: 2    3. Healthcare maintenance  #HCM  Cancer screening  -Colon Cancer: Multiple second-degree relatives but no first-degree relatives with colon cancer, consider initiation of screening at 45, if iron deficiency does not resolve, we will proceed with scope in 3 to 6 months  -Lung cancer: LDCT Not indicated  -Prostate cancer: Not yet indicated, no family history  -Lipids, metabolic panel and A1c Up to date  Depression  -PHQ-2 Depression Screening  Little interest or pleasure in doing things?     Feeling down, depressed, or hopeless?     PHQ-2 Total Score        Infections screen for STI today    Immunizations  Immunization History   Administered Date(s) Administered    Influenza, Unspecified 10/18/2017    PPD Test 08/25/2008      -Counseling counseled on smoking cessation    4. Abnormal blood smear  Referral pending to hematology, discussed smudge cell finding and persistent leukocytosis with patient, discussed that this may be due to iron deficiency and smoking    5. Chronic gout involving toe without tophus, unspecified cause, unspecified laterality  Chronic, stable  -Continue colchicine and allopurinol, colchicine is only being used for initiation of allopurinol and should fall off after couple months    6. Iron deficiency  Recheck labs at next visit, continue iron supplementation       Counseling was given to patient for the following topics: instructions for management.    Follow Up  Return in about 2 months (around 2/27/2025).    Navid Corona MD  [facility]  [address] [facility] [place] [address] [address]  [phone]

## 2024-12-28 LAB — RPR SER QL: NORMAL

## 2024-12-30 LAB
C TRACH RRNA SPEC QL NAA+PROBE: NEGATIVE
N GONORRHOEA RRNA SPEC QL NAA+PROBE: NEGATIVE
T VAGINALIS RRNA SPEC QL NAA+PROBE: POSITIVE

## 2024-12-30 RX ORDER — METRONIDAZOLE 500 MG/1
2000 TABLET ORAL ONCE
Qty: 4 TABLET | Refills: 0 | Status: SHIPPED | OUTPATIENT
Start: 2024-12-30 | End: 2024-12-30

## 2025-01-24 ENCOUNTER — HOSPITAL ENCOUNTER (OUTPATIENT)
Dept: SLEEP MEDICINE | Facility: HOSPITAL | Age: 35
Discharge: HOME | End: 2025-01-24
Admitting: STUDENT IN AN ORGANIZED HEALTH CARE EDUCATION/TRAINING PROGRAM
Payer: MEDICAID

## 2025-01-24 VITALS — BODY MASS INDEX: 47.74 KG/M2 | WEIGHT: 315 LBS | HEIGHT: 68 IN

## 2025-01-24 DIAGNOSIS — R06.83 SNORING: ICD-10-CM

## 2025-01-24 PROCEDURE — 95800 SLP STDY UNATTENDED: CPT

## 2025-01-28 PROCEDURE — 95800 SLP STDY UNATTENDED: CPT | Performed by: INTERNAL MEDICINE

## 2025-01-29 ENCOUNTER — TELEPHONE (OUTPATIENT)
Age: 35
End: 2025-01-29
Payer: MEDICAID

## 2025-01-29 DIAGNOSIS — G47.33 OSA (OBSTRUCTIVE SLEEP APNEA): Primary | ICD-10-CM

## 2025-01-29 NOTE — TELEPHONE ENCOUNTER
Caller: Murphy Gutierrez    Relationship: Self    Best call back number: [phone]    Caller requesting test results: PT    What test was performed: SLEEP STUDY    When was the test performed: 1/24/25    Where was the test performed: HOME    Additional notes: PT WOULD LIKE DR TO GO OVER SLEEP STUDY RESULTS WITH HIM

## 2025-01-30 NOTE — TELEPHONE ENCOUNTER
Name: Murphy Gutierrez      Relationship: Self      Best Callback Number: [phone]       HUB PROVIDED THE RELAY MESSAGE FROM THE OFFICE          ADDITIONAL INFORMATION: PATIENT STATED HE WOULD NEED TO BE REFERRED

## 2025-01-30 NOTE — TELEPHONE ENCOUNTER
Tamia Arrington's sleep study showed severe sleep apnea that would benefit from CPAP. Please see if he is scheduled to see the sleep clinic for initiation of that. If not I will refer him to get him in.

## 2025-01-31 ENCOUNTER — OFFICE VISIT (OUTPATIENT)
Age: 35
End: 2025-01-31
Payer: MEDICAID

## 2025-01-31 VITALS
SYSTOLIC BLOOD PRESSURE: 138 MMHG | WEIGHT: 315 LBS | OXYGEN SATURATION: 97 % | BODY MASS INDEX: 47.74 KG/M2 | HEIGHT: 68 IN | HEART RATE: 104 BPM | DIASTOLIC BLOOD PRESSURE: 68 MMHG

## 2025-01-31 DIAGNOSIS — G47.33 OSA (OBSTRUCTIVE SLEEP APNEA): Primary | ICD-10-CM

## 2025-01-31 DIAGNOSIS — E66.01 MORBID (SEVERE) OBESITY DUE TO EXCESS CALORIES: ICD-10-CM

## 2025-01-31 DIAGNOSIS — I10 ESSENTIAL HYPERTENSION: ICD-10-CM

## 2025-01-31 PROCEDURE — 1159F MED LIST DOCD IN RCRD: CPT | Performed by: STUDENT IN AN ORGANIZED HEALTH CARE EDUCATION/TRAINING PROGRAM

## 2025-01-31 PROCEDURE — 1160F RVW MEDS BY RX/DR IN RCRD: CPT | Performed by: STUDENT IN AN ORGANIZED HEALTH CARE EDUCATION/TRAINING PROGRAM

## 2025-01-31 PROCEDURE — 3078F DIAST BP <80 MM HG: CPT | Performed by: STUDENT IN AN ORGANIZED HEALTH CARE EDUCATION/TRAINING PROGRAM

## 2025-01-31 PROCEDURE — 3075F SYST BP GE 130 - 139MM HG: CPT | Performed by: STUDENT IN AN ORGANIZED HEALTH CARE EDUCATION/TRAINING PROGRAM

## 2025-01-31 PROCEDURE — 99214 OFFICE O/P EST MOD 30 MIN: CPT | Performed by: STUDENT IN AN ORGANIZED HEALTH CARE EDUCATION/TRAINING PROGRAM

## 2025-01-31 PROCEDURE — 1125F AMNT PAIN NOTED PAIN PRSNT: CPT | Performed by: STUDENT IN AN ORGANIZED HEALTH CARE EDUCATION/TRAINING PROGRAM

## 2025-01-31 RX ORDER — SEMAGLUTIDE 0.25 MG/.5ML
0.25 INJECTION, SOLUTION SUBCUTANEOUS WEEKLY
Qty: 2 ML | Refills: 2 | Status: SHIPPED | OUTPATIENT
Start: 2025-01-31

## 2025-01-31 NOTE — PROGRESS NOTES
Office Note     Name: Murphy Gutierrez    : 1990     MRN: [id number]     Chief Complaint  Obesity    Subjective     History of Present Illness:  Murphy Gutierrez is a 34 y.o. male who presents today for follow up after sleep study.  He has been doing fairly well.  He did have a day or 2 where he had headaches and high blood pressure but he is not regularly checking his blood pressure.  He has severe sleep apnea based on his sleep study.  He has interested in weight loss medication to help him improve his sleep apnea.    Past Medical History:   Past Medical History:   Diagnosis Date    Sleep apnea        Past Surgical History:   Past Surgical History:   Procedure Laterality Date    NO PAST SURGERIES         Immunizations:   Immunization History   Administered Date(s) Administered    Influenza, Unspecified 10/18/2017    PPD Test 2008        Medications:     Current Outpatient Medications:     allopurinol (Zyloprim) 100 MG tablet, Take 1 tablet by mouth Daily., Disp: 90 tablet, Rfl: 2    colchicine 0.6 MG tablet, Take 1 tablet by mouth Daily for 90 days. Stop after 90 days.  Change to every other day if you develop loose stools., Disp: 90 tablet, Rfl: 0    ferrous sulfate 324 (65 Fe) MG tablet delayed-release EC tablet, Take 1 tablet by mouth Daily With Breakfast., Disp: 30 tablet, Rfl: 2    losartan (COZAAR) 100 MG tablet, Take 1 tablet by mouth Daily., Disp: 90 tablet, Rfl: 2    pantoprazole (PROTONIX) 40 MG EC tablet, Take 1 tablet by mouth Every Morning Before Breakfast., Disp: 90 tablet, Rfl: 3    Semaglutide-Weight Management (Wegovy) 0.25 MG/0.5ML solution auto-injector, Inject 0.5 mL under the skin into the appropriate area as directed 1 (One) Time Per Week., Disp: 2 mL, Rfl: 2    Allergies:   No Known Allergies    Family History:   Family History   Problem Relation Age of Onset    Hypertension Mother     Diabetes Mother     Hypertension Father     Diabetes Father        Social History:   Social  "History     Socioeconomic History    Marital status: Single   Tobacco Use    Smoking status: Some Days     Current packs/day: 0.25     Average packs/day: 0.3 packs/day for 17.1 years (4.3 ttl pk-yrs)     Types: Cigarettes, Cigars     Start date: 2008    Smokeless tobacco: Never   Vaping Use    Vaping status: Never Used   Substance and Sexual Activity    Alcohol use: No    Drug use: Not Currently     Types: Marijuana     Comment: occasionally    Sexual activity: Yes     Partners: Female         Objective     Vital Signs  /68 (BP Location: Left arm, Patient Position: Sitting, Cuff Size: Large Adult)   Pulse 104   Ht 173.3 cm (68.23\")   Wt (!) 201 kg (442 lb 12.8 oz)   SpO2 97%   BMI 66.88 kg/m²   Estimated body mass index is 66.88 kg/m² as calculated from the following:    Height as of this encounter: 173.3 cm (68.23\").    Weight as of this encounter: 201 kg (442 lb 12.8 oz).            Physical Exam  Constitutional:       General: He is not in acute distress.     Appearance: He is not toxic-appearing.   Cardiovascular:      Rate and Rhythm: Normal rate and regular rhythm.      Heart sounds: No murmur heard.     No friction rub. No gallop.   Pulmonary:      Effort: Pulmonary effort is normal.      Breath sounds: Normal breath sounds.   Abdominal:      General: Abdomen is flat. There is no distension.   Skin:     General: Skin is warm and dry.   Neurological:      Mental Status: He is alert.   Psychiatric:         Mood and Affect: Mood normal.         Behavior: Behavior normal.          Assessment and Plan     1. HIEU (obstructive sleep apnea)  Referred to sleep medicine  HIEU is severe and may have a component of OHS.  Rx wegovy as below  - Semaglutide-Weight Management (Wegovy) 0.25 MG/0.5ML solution auto-injector; Inject 0.5 mL under the skin into the appropriate area as directed 1 (One) Time Per Week.  Dispense: 2 mL; Refill: 2    2. Essential hypertension  Chronic stable, should improve with weight loss " and/or treatment of HIEU, continue losartan 100    3. Morbid (severe) obesity due to excess calories  Chronic uncontrolled    Patient has uncontrolled hypertension and HIEU and is high risk for ASCVD. Has failed lifestyle measures  -Rx wegovy, if not approved would refer to bariatric surgery  -No history of pancreatitis or personal or family history of thyroid cancer  - Semaglutide-Weight Management (Wegovy) 0.25 MG/0.5ML solution auto-injector; Inject 0.5 mL under the skin into the appropriate area as directed 1 (One) Time Per Week.  Dispense: 2 mL; Refill: 2       Counseling was given to patient for the following topics: instructions for management.    Follow Up  No follow-ups on file.    Navid Corona MD  [facility]  [address] [address] [phone]   No

## 2025-02-03 ENCOUNTER — CONSULT (OUTPATIENT)
Age: 35
End: 2025-02-03
Payer: MEDICAID

## 2025-02-03 ENCOUNTER — LAB (OUTPATIENT)
Facility: HOSPITAL | Age: 35
End: 2025-02-03
Payer: MEDICAID

## 2025-02-03 ENCOUNTER — PRIOR AUTHORIZATION (OUTPATIENT)
Age: 35
End: 2025-02-03
Payer: MEDICAID

## 2025-02-03 VITALS
HEIGHT: 69 IN | WEIGHT: 315 LBS | TEMPERATURE: 97.2 F | RESPIRATION RATE: 20 BRPM | BODY MASS INDEX: 46.65 KG/M2 | DIASTOLIC BLOOD PRESSURE: 83 MMHG | OXYGEN SATURATION: 96 % | SYSTOLIC BLOOD PRESSURE: 126 MMHG | HEART RATE: 84 BPM

## 2025-02-03 DIAGNOSIS — D72.828 NEUTROPHILIC LEUKOCYTOSIS: ICD-10-CM

## 2025-02-03 DIAGNOSIS — R71.8 MICROCYTIC ERYTHROCYTES: Primary | ICD-10-CM

## 2025-02-03 DIAGNOSIS — R71.8 MICROCYTIC ERYTHROCYTES: ICD-10-CM

## 2025-02-03 PROCEDURE — 3079F DIAST BP 80-89 MM HG: CPT | Performed by: INTERNAL MEDICINE

## 2025-02-03 PROCEDURE — 83020 HEMOGLOBIN ELECTROPHORESIS: CPT

## 2025-02-03 PROCEDURE — 36415 COLL VENOUS BLD VENIPUNCTURE: CPT

## 2025-02-03 PROCEDURE — 1126F AMNT PAIN NOTED NONE PRSNT: CPT | Performed by: INTERNAL MEDICINE

## 2025-02-03 PROCEDURE — 99204 OFFICE O/P NEW MOD 45 MIN: CPT | Performed by: INTERNAL MEDICINE

## 2025-02-03 PROCEDURE — 3074F SYST BP LT 130 MM HG: CPT | Performed by: INTERNAL MEDICINE

## 2025-02-03 NOTE — LETTER
February 3, 2025     Ortega Corona MD  [address]  [place] [address]    Patient: Murphy Gutierrez   YOB: 1990   Date of Visit: 2/3/2025     Dear Ortega Corona MD:       Thank you for referring Murphy Gutierrez to me for evaluation. Below are the relevant portions of my assessment and plan of care.    If you have questions, please do not hesitate to call me. I look forward to following Murphy along with you.         Sincerely,        Chirag Shah MD        CC: No Recipients    Chirag Shah MD  02/03/25 1409  Sign when Signing Visit  ID: 34 y.o. year old male from [place] [id number]    PCP: Ortega Corona MD    REFERRING PHYSICIAN: Ortega Corona MD    Reason for Consultation: Persistent leukocytosis and microcytic erythrocytes    Dear Dr. Corona    It is a pleasure to meet Mr. Gutierrez today.  He is a very pleasant 34-year-old gentleman who presents today for consultation for persistent leukocytosis and microcytic erythrocytes.  He does have significant morbid obesity.  He is planning to start Wegovy soon.  Denies any underlying diabetes.        Past Medical History:   Diagnosis Date   • Sleep apnea        Past Surgical History:   Procedure Laterality Date   • NO PAST SURGERIES         Social History     Socioeconomic History   • Marital status: Single   Tobacco Use   • Smoking status: Some Days     Current packs/day: 0.25     Average packs/day: 0.3 packs/day for 17.1 years (4.3 ttl pk-yrs)     Types: Cigarettes, Cigars     Start date: 2008   • Smokeless tobacco: Never   Vaping Use   • Vaping status: Never Used   Substance and Sexual Activity   • Alcohol use: No   • Drug use: Not Currently     Types: Marijuana     Comment: occasionally   • Sexual activity: Yes     Partners: Female       Family History   Problem Relation Age of Onset   • Hypertension Mother    • Diabetes Mother    • Hypertension Father    • Diabetes Father        Review of Systems:    16 point review of  systems was performed and reviewed and scanned into the EMR    Review of Systems - Oncology      Current Outpatient Medications:   •  allopurinol (Zyloprim) 100 MG tablet, Take 1 tablet by mouth Daily., Disp: 90 tablet, Rfl: 2  •  colchicine 0.6 MG tablet, Take 1 tablet by mouth Daily for 90 days. Stop after 90 days.  Change to every other day if you develop loose stools., Disp: 90 tablet, Rfl: 0  •  ferrous sulfate 324 (65 Fe) MG tablet delayed-release EC tablet, Take 1 tablet by mouth Daily With Breakfast., Disp: 30 tablet, Rfl: 2  •  losartan (COZAAR) 100 MG tablet, Take 1 tablet by mouth Daily., Disp: 90 tablet, Rfl: 2  •  pantoprazole (PROTONIX) 40 MG EC tablet, Take 1 tablet by mouth Every Morning Before Breakfast., Disp: 90 tablet, Rfl: 3  •  Semaglutide-Weight Management (Wegovy) 0.25 MG/0.5ML solution auto-injector, Inject 0.5 mL under the skin into the appropriate area as directed 1 (One) Time Per Week., Disp: 2 mL, Rfl: 2         No Known Allergies      ECOG score: 0           Objective    Vitals:    02/03/25 1310   BP: 126/83   Pulse: 84   Resp: 20   Temp: 97.2 °F (36.2 °C)   SpO2: 96%     Body mass index is 63.85 kg/m².  Body surface area is 2.89 meters squared.        02/03/25  1310   Weight: (!) 198 kg (436 lb)     Pain Score    02/03/25 1310   PainSc: 0-No pain          Physical Exam    General: well appearing, in no acute distress  HEENT: sclera anicteric, oropharynx clear, neck is supple  Lymphatics: no cervical, supraclavicular, or axillary adenopathy  Cardiovascular: regular rate and rhythm, no murmurs, rubs or gallops  Lungs: clear to auscultation bilaterally  Abdomen: soft, nontender, nondistended.  No palpable organomegaly  Extremities: no lower extremity edema  Skin: no rashes, lesions, bruising, or petechiae  Msk:  Shows no weakness of the large muscle groups  Psych: Mood is stable        Lab Results   Component Value Date    GLUCOSE 134 (H) 12/27/2024    BUN 15 12/27/2024    CREATININE 1.04  12/27/2024     12/27/2024    K 4.3 12/27/2024     12/27/2024    CO2 25.0 12/27/2024    CALCIUM 9.0 12/27/2024    PROTEINTOT 7.1 12/27/2024    ALBUMIN 3.6 12/27/2024    BILITOT 0.2 12/27/2024    ALKPHOS 74 12/27/2024    AST 23 12/27/2024    ALT 24 12/27/2024       Lab Results   Component Value Date    HGB 12.5 (L) 12/18/2024    HCT 41.6 12/18/2024    MCV 74.3 (L) 12/18/2024     12/18/2024    WBC 12.34 (H) 12/18/2024    NEUTROABS 8.10 (H) 12/18/2024    LYMPHSABS 2.87 11/29/2023    MONOSABS 0.65 11/29/2023    EOSABS 0.39 12/18/2024    BASOSABS 0.14 12/18/2024       Home Sleep Study    Result Date: 1/28/2025         1.  Severe obstructive sleep apnea.      2.  Nocturnal hypoxemia (41 % time below 90% O2 sat)      3.  Snoring.     RECOMMENDATIONS: 1.   Available data is suggestive of severe obstructive sleep apnea along with severe sleep hypoxia. Consider auto CPAP trial 4 to 20 cm of water pressure and CPAP download on therapy to make sure we are not missing any other pathology and to determine the adequacy of the pressure settings.  Would strongly recommend overnight oximetry on auto CPAP and if hypoxia persists or AHI is not controlled on download then will strongly recommend in lab titration study as patient may have obesity hypoventilation and may benefit from BiPAP therapy. 2.  Discuss good sleep hygiene habits, including but not limited to fixed wake up and sleep time, avoid alcohol 4 hours before sleep and not driving while drowsy. Maintain ideal body weight.      I have conducted an epoch by epoch review of the raw data and agree with the interpretation. Electronically signed by:  Uche Pappas MD 01/28/25 15:58 EST     XR Foot 3+ View Right    Result Date: 11/28/2024  No acute fracture. Images reviewed, interpreted, and dictated by Dr. ANN-MARIE Perea. Transcribed by Toyin Trotter PA-C.        Assessment     1.  Persistent microcytic erythrocytes.  With no iron deficiency.  I suspect  he has an underlying beta thalassemia.  I will check it to see if this is true or not.  If he does have it there is no role  for any intervention.  He does have to be aware of it since it can affect progeny.    2.  Persistent leukocytosis.  This is very convincing for a reactive process.  I suspect it is due to underlying metabolic syndrome related to his obesity.  Has not significantly changed since 2020 and so infectious or malignant process would have progressed by now.          Thank you for allowing me to participate in the care of this patient.    Yours sincerely,    Chirag Shah MD  [facility]  Hematology and Oncology    Return in (Approximately): 1 month    Orders Placed This Encounter   Procedures   • Hemoglobinopathy Fractionation Cascade     Standing Status:   Future     Number of Occurrences:   1     Standing Expiration Date:   2/3/2026     Order Specific Question:   Release to patient     Answer:   Routine Release [[id number]]

## 2025-02-03 NOTE — TELEPHONE ENCOUNTER
(Key: [id number])  Rx #: [id number]  Wegovy 0.25MG/0.5ML auto-injectors    Sent to plan-Awaiting response  2/3/25

## 2025-02-03 NOTE — TELEPHONE ENCOUNTER
Wegovy 0.25MG/0.5ML auto-injectors has been denied by plan.    Our guideline named WEGOVY requires the following rule(s) be met for approval:   The medication is being prescribed to lower the risk of death from cardiovascular [heart]   causes, myocardial infarction [heart attack], or stroke [a type of brain damage]. Please note:   Wegovy used for weight loss is considered a benefit exclusion pursuant to the Social   Security Act 1927(D)(2).  This is why your request is denied.

## 2025-02-03 NOTE — PROGRESS NOTES
ID: 34 y.o. year old male from [facility] [id number]    PCP: Ortega Corona MD    REFERRING PHYSICIAN: Ortega Corona MD    Reason for Consultation: Persistent leukocytosis and microcytic erythrocytes    Dear Dr. Corona    It is a pleasure to meet Mr. Gutierrez today.  He is a very pleasant 34-year-old gentleman who presents today for consultation for persistent leukocytosis and microcytic erythrocytes.  He does have significant morbid obesity.  He is planning to start Wegovy soon.  Denies any underlying diabetes.        Past Medical History:   Diagnosis Date    Sleep apnea        Past Surgical History:   Procedure Laterality Date    NO PAST SURGERIES         Social History     Socioeconomic History    Marital status: Single   Tobacco Use    Smoking status: Some Days     Current packs/day: 0.25     Average packs/day: 0.3 packs/day for 17.1 years (4.3 ttl pk-yrs)     Types: Cigarettes, Cigars     Start date: 2008    Smokeless tobacco: Never   Vaping Use    Vaping status: Never Used   Substance and Sexual Activity    Alcohol use: No    Drug use: Not Currently     Types: Marijuana     Comment: occasionally    Sexual activity: Yes     Partners: Female       Family History   Problem Relation Age of Onset    Hypertension Mother     Diabetes Mother     Hypertension Father     Diabetes Father        Review of Systems:    16 point review of systems was performed and reviewed and scanned into the EMR    Review of Systems - Oncology      Current Outpatient Medications:     allopurinol (Zyloprim) 100 MG tablet, Take 1 tablet by mouth Daily., Disp: 90 tablet, Rfl: 2    colchicine 0.6 MG tablet, Take 1 tablet by mouth Daily for 90 days. Stop after 90 days.  Change to every other day if you develop loose stools., Disp: 90 tablet, Rfl: 0    ferrous sulfate 324 (65 Fe) MG tablet delayed-release EC tablet, Take 1 tablet by mouth Daily With Breakfast., Disp: 30 tablet, Rfl: 2    losartan (COZAAR) 100 MG tablet, Take 1 tablet by mouth  Daily., Disp: 90 tablet, Rfl: 2    pantoprazole (PROTONIX) 40 MG EC tablet, Take 1 tablet by mouth Every Morning Before Breakfast., Disp: 90 tablet, Rfl: 3    Semaglutide-Weight Management (Wegovy) 0.25 MG/0.5ML solution auto-injector, Inject 0.5 mL under the skin into the appropriate area as directed 1 (One) Time Per Week., Disp: 2 mL, Rfl: 2         No Known Allergies      ECOG score: 0           Objective     Vitals:    02/03/25 1310   BP: 126/83   Pulse: 84   Resp: 20   Temp: 97.2 °F (36.2 °C)   SpO2: 96%     Body mass index is 63.85 kg/m².  Body surface area is 2.89 meters squared.        02/03/25  1310   Weight: (!) 198 kg (436 lb)     Pain Score    02/03/25 1310   PainSc: 0-No pain          Physical Exam    General: well appearing, in no acute distress  HEENT: sclera anicteric, oropharynx clear, neck is supple  Lymphatics: no cervical, supraclavicular, or axillary adenopathy  Cardiovascular: regular rate and rhythm, no murmurs, rubs or gallops  Lungs: clear to auscultation bilaterally  Abdomen: soft, nontender, nondistended.  No palpable organomegaly  Extremities: no lower extremity edema  Skin: no rashes, lesions, bruising, or petechiae  Msk:  Shows no weakness of the large muscle groups  Psych: Mood is stable        Lab Results   Component Value Date    GLUCOSE 134 (H) 12/27/2024    BUN 15 12/27/2024    CREATININE 1.04 12/27/2024     12/27/2024    K 4.3 12/27/2024     12/27/2024    CO2 25.0 12/27/2024    CALCIUM 9.0 12/27/2024    PROTEINTOT 7.1 12/27/2024    ALBUMIN 3.6 12/27/2024    BILITOT 0.2 12/27/2024    ALKPHOS 74 12/27/2024    AST 23 12/27/2024    ALT 24 12/27/2024       Lab Results   Component Value Date    HGB 12.5 (L) 12/18/2024    HCT 41.6 12/18/2024    MCV 74.3 (L) 12/18/2024     12/18/2024    WBC 12.34 (H) 12/18/2024    NEUTROABS 8.10 (H) 12/18/2024    LYMPHSABS 2.87 11/29/2023    MONOSABS 0.65 11/29/2023    EOSABS 0.39 12/18/2024    BASOSABS 0.14 12/18/2024       Home Sleep  Study    Result Date: 1/28/2025         1.  Severe obstructive sleep apnea.      2.  Nocturnal hypoxemia (41 % time below 90% O2 sat)      3.  Snoring.     RECOMMENDATIONS: 1.   Available data is suggestive of severe obstructive sleep apnea along with severe sleep hypoxia. Consider auto CPAP trial 4 to 20 cm of water pressure and CPAP download on therapy to make sure we are not missing any other pathology and to determine the adequacy of the pressure settings.  Would strongly recommend overnight oximetry on auto CPAP and if hypoxia persists or AHI is not controlled on download then will strongly recommend in lab titration study as patient may have obesity hypoventilation and may benefit from BiPAP therapy. 2.  Discuss good sleep hygiene habits, including but not limited to fixed wake up and sleep time, avoid alcohol 4 hours before sleep and not driving while drowsy. Maintain ideal body weight.      I have conducted an epoch by epoch review of the raw data and agree with the interpretation. Electronically signed by:  Uche Pappas MD 01/28/25 15:58 EST     XR Foot 3+ View Right    Result Date: 11/28/2024  No acute fracture. Images reviewed, interpreted, and dictated by Dr. ANN-MARIE Perea. Transcribed by Toyin Trotter PA-C.        Assessment      1.  Persistent microcytic erythrocytes.  With no iron deficiency.  I suspect he has an underlying beta thalassemia.  I will check it to see if this is true or not.  If he does have it there is no role  for any intervention.  He does have to be aware of it since it can affect progeny.    2.  Persistent leukocytosis.  This is very convincing for a reactive process.  I suspect it is due to underlying metabolic syndrome related to his obesity.  Has not significantly changed since 2020 and so infectious or malignant process would have progressed by now.          Thank you for allowing me to participate in the care of this patient.    Yours sincerely,    Chirag  MD Pablo  [facility]  Hematology and Oncology    Return in (Approximately): 1 month    Orders Placed This Encounter   Procedures    Hemoglobinopathy Fractionation Cascade     Standing Status:   Future     Number of Occurrences:   1     Standing Expiration Date:   2/3/2026     Order Specific Question:   Release to patient     Answer:   Routine Release [[id number]]

## 2025-02-04 LAB
HGB A MFR BLD ELPH: 97.9 % (ref 96.4–98.8)
HGB A2 MFR BLD ELPH: 2.1 % (ref 1.8–3.2)
HGB F MFR BLD ELPH: 0 % (ref 0–2)
HGB FRACT BLD-IMP: NORMAL
HGB S MFR BLD ELPH: 0 %

## 2025-02-04 NOTE — PROGRESS NOTES
Chief Complaint  Snoring, Sleeping Problem, Heartburn, Leg/body Jerks During Sleep, and Unable To Stay Asleep    Subjective     History of Present Illness:  Murphy Gutierrez is a 34 y.o. male with obesity, and sleep apnea.  Patient is referred by Ortega Corona MD with primary care.  A home sleep test obtained on 1/25/2025 revealed severe obstructive sleep apnea with an AHI of 122.6/h.  Patient is referred to establish care.  Review of self-reported questionnaire notes symptoms including snoring, morning headaches, heartburn/reflux, leg cramps, difficulty staying asleep which been ongoing for more than 5 years.  Patient typically goes to bed at 12-3 a.m. waking at 6:30 AM on weekdays and 9-10 on weekends.  Patient estimates an average of 6-7 hours of sleep per night and it does not take long for him to get to sleep.  Patient smokes cigars, drinks alcohol 2-3 times per week, and has used recreational drugs.  The patient's significant other reports witnessed episodes of apnea which occur multiple times per night.  Additionally, the patient is observed with heavy snoring which occurs continuously, nightly, and in all sleeping positions.    Further details are as follows:    [place] Scale is (out of 24): Total score: 17     Estimated average amount of sleep per night: 6 to 7 hours  Number of times he wakes up at night: 2-3 times  Difficulty falling back asleep: occasionally  It usually takes a few minutes to go to sleep.  He feels sleepy upon waking up: yes  Rotating or night shift work: no    Drowsiness/Sleepiness:  He exhibits the following:  excessive daytime sleepiness  excessive daytime fatigue  takes scheduled naps during the day    Snoring/Breathing:  He exhibits the following:  loud snoring, snores in all sleep positions, quits breathing at night, awakens with dry mouth, awakens gasping for breath, awakens with coughing, and morning headaches    Head Injury:  He exhibits the following:  No    Reflux:  He describes  the following:  wakes up at night with a sour taste or burning sensation in chest  takes medication for reflux    Narcolepsy:  He exhibits the following:  none    RLS/PLMs:  He describes the following:  discomfort in legs with an urge to move them    Insomnia:  He describes the following:  frequent awakenings  bothered by pain at night-leg cramp    Parasomnia:  He exhibits the following:  excessive sweating while sleeping    Weight:       02/07/25  0758   Weight: (!) 200 kg (440 lb)      Weight change in the last year:  gain: 0 lbs    The patient's relevant past medical, surgical, family, and social history reviewed and updated in Epic as appropriate.    Review of Systems   Constitutional:  Positive for fatigue.   HENT: Negative.     Eyes:  Positive for pain.   Respiratory:  Positive for wheezing.    Cardiovascular: Negative.    Gastrointestinal:  Positive for diarrhea.   Endocrine: Positive for heat intolerance.   Genitourinary: Negative.    Musculoskeletal:  Positive for back pain, neck pain and neck stiffness.   Skin: Negative.    Allergic/Immunologic: Negative.    Neurological:  Positive for dizziness, weakness, light-headedness and headache.   Hematological: Negative.    Psychiatric/Behavioral: Negative.     All other systems reviewed and are negative.      PMH:    Past Medical History:   Diagnosis Date    Sleep apnea      Past Surgical History:   Procedure Laterality Date    NO PAST SURGERIES         No Known Allergies    MEDS:  Prior to Admission medications    Medication Sig Start Date End Date Taking? Authorizing Provider   allopurinol (Zyloprim) 100 MG tablet Take 1 tablet by mouth Daily. 12/19/24   Ortega Corona MD   colchicine 0.6 MG tablet Take 1 tablet by mouth Daily for 90 days. Stop after 90 days.  Change to every other day if you develop loose stools. 12/19/24 3/19/25  Ortega Corona MD   ferrous sulfate 324 (65 Fe) MG tablet delayed-release EC tablet Take 1 tablet by mouth Daily With Breakfast.  "12/19/24   Ortega Corona MD   losartan (COZAAR) 100 MG tablet Take 1 tablet by mouth Daily. 12/27/24   Ortega Corona MD   pantoprazole (PROTONIX) 40 MG EC tablet Take 1 tablet by mouth Every Morning Before Breakfast. 12/18/24   Ortega Corona MD   Semaglutide-Weight Management (Wegovy) 0.25 MG/0.5ML solution auto-injector Inject 0.5 mL under the skin into the appropriate area as directed 1 (One) Time Per Week. 1/31/25   Ortega Corona MD       FH:  Family History   Problem Relation Age of Onset    Hypertension Mother     Diabetes Mother     Hypertension Father     Diabetes Father        Objective   Vital Signs:  /70 (BP Location: Left arm, Patient Position: Sitting, Cuff Size: Adult)   Pulse 102   Temp 96.8 °F (36 °C) (Temporal)   Ht 176 cm (69.29\")   Wt (!) 200 kg (440 lb)   SpO2 97%   BMI 64.43 kg/m²     Patient's (Body mass index is 64.43 kg/m².) indicates that they are morbidly obese (BMI > 40 or > 35 with obesity - related health condition) with health related conditions that include obstructive sleep apnea, hypertension, coronary heart disease, diabetes mellitus, and impaired fasting glucose . Weight is unchanged. BMI is is above average; BMI management plan is completed. We discussed portion control and increasing exercise. Starting zepbound.            Physical Exam  Vitals reviewed.   Constitutional:       Appearance: Normal appearance.   HENT:      Head: Normocephalic and atraumatic.      Nose: Nose normal.      Mouth/Throat:      Mouth: Mucous membranes are moist.   Cardiovascular:      Rate and Rhythm: Normal rate and regular rhythm.      Heart sounds: No murmur heard.     No friction rub. No gallop.   Pulmonary:      Effort: Pulmonary effort is normal. No respiratory distress.      Breath sounds: Normal breath sounds. No wheezing or rhonchi.   Neurological:      Mental Status: He is alert and oriented to person, place, and time.   Psychiatric:         Behavior: Behavior normal. "       Mallampati Score: IV (only hard palate visible)    Result Review :              Assessment and Plan  Murphy Gutierrez is a 34 y.o. male with obesity who presents to [facility] care for severe obstructive sleep apnea.  A home sleep test obtained on 1/25/2025 revealed severe obstructive sleep apnea with an AHI of 122.6/h.  Therapy is recommended.    I will place orders for new device and supplies and the best the patient return for follow-up in compliance in 31-90 days.  I have noted the patient will need to use the device more than 4 hours a night, more than 70% of the time in order to remain fully compliant.    I have discussed weight loss as it pertains to obstructive sleep apnea.    Diagnoses and all orders for this visit:    1. Obstructive sleep apnea, adult (Primary)  -     PAP Therapy    2. Morbid (severe) obesity due to excess calories                 I discussed the consequences of uncontrolled sleep apnea including hypertension, heart disease, diabetes, stroke, and dementia. I further discussed sleep apnea therapeutic options including CPAP, Weight loss, Oral dental appliance, and surgery.         Follow Up  Return for 31 to 90 days after PAP setup.  Patient was given instructions and counseling regarding his condition or for health maintenance advice. Please see specific information pulled into the AVS if appropriate.     RADHA Braden, [facility]-BC  Pulmonology, Critical Care, and Sleep Medicine

## 2025-02-05 NOTE — TELEPHONE ENCOUNTER
Called and notified pt to contact insurance about zepbound voiced understanding no further questions

## 2025-02-06 ENCOUNTER — TELEPHONE (OUTPATIENT)
Age: 35
End: 2025-02-06
Payer: MEDICAID

## 2025-02-06 NOTE — TELEPHONE ENCOUNTER
Caller: Murphy Gutierrez    Relationship to patient: Self      Best call back number: [phone]     Provider: MANDO CHIRINOS    Medication PA needed: ZEPBOUND    Reason for call/Prior Auth:   SEE TE DATED 2/3/25. IT WAS CLOSED SO I COULD NOT ADD NOTES TO THAT ENCOUNTER. PATIENT CALLED BACK AND STATED HE CALLED HIS INS AND THEY SAID TO CALL FOR A PA. CALL [phone]

## 2025-02-07 ENCOUNTER — OFFICE VISIT (OUTPATIENT)
Dept: SLEEP MEDICINE | Age: 35
End: 2025-02-07
Payer: MEDICAID

## 2025-02-07 VITALS
WEIGHT: 315 LBS | BODY MASS INDEX: 46.65 KG/M2 | HEART RATE: 102 BPM | SYSTOLIC BLOOD PRESSURE: 140 MMHG | OXYGEN SATURATION: 97 % | DIASTOLIC BLOOD PRESSURE: 70 MMHG | HEIGHT: 69 IN | TEMPERATURE: 96.8 F

## 2025-02-07 DIAGNOSIS — G47.33 OBSTRUCTIVE SLEEP APNEA, ADULT: Primary | ICD-10-CM

## 2025-02-07 DIAGNOSIS — E66.01 MORBID (SEVERE) OBESITY DUE TO EXCESS CALORIES: ICD-10-CM

## 2025-02-10 NOTE — TELEPHONE ENCOUNTER
Caller: Murphy Gutierrez    Relationship: Self    Best call back number: [phone]     What is the best time to reach you: ANYTIME, OK TO LEAVE VOICEMAIL.    Who are you requesting to speak with (clinical staff, provider,  specific staff member): CLINICAL STAFF    Do you know the name of the person who called: PATIENT    What was the call regarding: PATIENT WOULD LIKE A CALL BACK TO DISCUSS IF THE PRIOR AUTHORIZATION WAS APPROVED OR NOT SO HE CAN PICK HIS MEDICATION UP TODAY.    Is it okay if the provider responds through MyChart: NO CALL ONLY

## 2025-02-11 NOTE — TELEPHONE ENCOUNTER
Called and notified pt I had called the insurance company and we are waiting a response from them. This could take up to 24 to 72 hour for a response.

## 2025-02-14 ENCOUNTER — TELEPHONE (OUTPATIENT)
Age: 35
End: 2025-02-14
Payer: MEDICAID

## 2025-02-14 NOTE — TELEPHONE ENCOUNTER
Caller: Murphy Gutierrez    Relationship: Self    Best call back number: [phone]    What medication are you requesting: SOMETHING FOR HEADACHE    What are your current symptoms: HEADACHE    How long have you been experiencing symptoms: SINCE 7PM LAST NIGHT    Have you had these symptoms before:    [] Yes  [x] No    Have you been treated for these symptoms before:   [] Yes  [x] No    If a prescription is needed, what is your preferred pharmacy and phone number: [id number] [address] [facility] [address] [facility] [phone] [facility] [phone] FX     Additional notes: PT STATES HE HAS HAD A HEADACHE SINCE 7PM LAST NIGHT AND WANTS TO SEE WHAT TO TAKE FOR IT

## 2025-02-18 NOTE — TELEPHONE ENCOUNTER
CALLED AND DISCUSSED HEADACHE WITH PATIENT. PATIENT STATED IT HAS RESOLVED - HE THINKS IT WAS BECAUSE IT WAS BECAUSE HE WAS SICK.     INQUIRED WHETHER PATIENT HAS HAD HEADACHES LIKE THIS BEFORE. HE STATED YES. ASKED PT IF HE HAD HX OF MIGRAINES. HE STATED YES.     PATIENT STATED MIGRAINES USED TO BE WORSE WHEN HE WAS YOUNGER, NOW THEY ONLY HAPPEN A COUPLE TIMES A MONTH. VISION AFFECTED, LIGHT SENSITIVE.     ADVISED PATIENT I WOULD LET PCP KNOW TO DISCUSS AT NEXT APPT ON 02/27/2025. ASKED PATIENT IF HE WOULD LIKE TO MOVE UP APPT. PT DECLINED.

## 2025-02-27 ENCOUNTER — LAB (OUTPATIENT)
Age: 35
End: 2025-02-27
Payer: MEDICAID

## 2025-02-27 ENCOUNTER — OFFICE VISIT (OUTPATIENT)
Age: 35
End: 2025-02-27
Payer: MEDICAID

## 2025-02-27 VITALS
WEIGHT: 315 LBS | OXYGEN SATURATION: 97 % | SYSTOLIC BLOOD PRESSURE: 136 MMHG | HEIGHT: 69 IN | BODY MASS INDEX: 46.65 KG/M2 | DIASTOLIC BLOOD PRESSURE: 70 MMHG | HEART RATE: 111 BPM

## 2025-02-27 DIAGNOSIS — G47.33 OSA (OBSTRUCTIVE SLEEP APNEA): ICD-10-CM

## 2025-02-27 DIAGNOSIS — G43.909 MIGRAINE WITHOUT STATUS MIGRAINOSUS, NOT INTRACTABLE, UNSPECIFIED MIGRAINE TYPE: ICD-10-CM

## 2025-02-27 DIAGNOSIS — I10 ESSENTIAL HYPERTENSION: Primary | ICD-10-CM

## 2025-02-27 DIAGNOSIS — I10 ESSENTIAL HYPERTENSION: ICD-10-CM

## 2025-02-27 LAB
ANION GAP SERPL CALCULATED.3IONS-SCNC: 10 MMOL/L (ref 5–15)
BUN SERPL-MCNC: 10 MG/DL (ref 6–20)
BUN/CREAT SERPL: 9.8 (ref 7–25)
CALCIUM SPEC-SCNC: 8.8 MG/DL (ref 8.6–10.5)
CHLORIDE SERPL-SCNC: 102 MMOL/L (ref 98–107)
CO2 SERPL-SCNC: 27 MMOL/L (ref 22–29)
CREAT SERPL-MCNC: 1.02 MG/DL (ref 0.76–1.27)
EGFRCR SERPLBLD CKD-EPI 2021: 98.3 ML/MIN/1.73
GLUCOSE SERPL-MCNC: 134 MG/DL (ref 65–99)
POTASSIUM SERPL-SCNC: 4.4 MMOL/L (ref 3.5–5.2)
SODIUM SERPL-SCNC: 139 MMOL/L (ref 136–145)

## 2025-02-27 PROCEDURE — 99214 OFFICE O/P EST MOD 30 MIN: CPT | Performed by: STUDENT IN AN ORGANIZED HEALTH CARE EDUCATION/TRAINING PROGRAM

## 2025-02-27 PROCEDURE — 1126F AMNT PAIN NOTED NONE PRSNT: CPT | Performed by: STUDENT IN AN ORGANIZED HEALTH CARE EDUCATION/TRAINING PROGRAM

## 2025-02-27 PROCEDURE — 1160F RVW MEDS BY RX/DR IN RCRD: CPT | Performed by: STUDENT IN AN ORGANIZED HEALTH CARE EDUCATION/TRAINING PROGRAM

## 2025-02-27 PROCEDURE — 3078F DIAST BP <80 MM HG: CPT | Performed by: STUDENT IN AN ORGANIZED HEALTH CARE EDUCATION/TRAINING PROGRAM

## 2025-02-27 PROCEDURE — 3075F SYST BP GE 130 - 139MM HG: CPT | Performed by: STUDENT IN AN ORGANIZED HEALTH CARE EDUCATION/TRAINING PROGRAM

## 2025-02-27 PROCEDURE — 36415 COLL VENOUS BLD VENIPUNCTURE: CPT | Performed by: STUDENT IN AN ORGANIZED HEALTH CARE EDUCATION/TRAINING PROGRAM

## 2025-02-27 PROCEDURE — 80048 BASIC METABOLIC PNL TOTAL CA: CPT | Performed by: STUDENT IN AN ORGANIZED HEALTH CARE EDUCATION/TRAINING PROGRAM

## 2025-02-27 PROCEDURE — 1159F MED LIST DOCD IN RCRD: CPT | Performed by: STUDENT IN AN ORGANIZED HEALTH CARE EDUCATION/TRAINING PROGRAM

## 2025-02-27 NOTE — PROGRESS NOTES
Office Note     Name: Murphy Gutierrez    : 1990     MRN: [id number]     Chief Complaint  Hypertension (Follow up) and Obesity    Subjective     History of Present Illness:  Murphy Gutierrez is a 34 y.o. male who presents today for follow up of chronic conditions including migraines, obesity, gerd, HTN, and HIEU.  He has started using his CPAP and plans to see bariatric surgery. Migraines come in waves for him.  They have been acting up lately.  Tolerating meds well.     Past Medical History:   Past Medical History:   Diagnosis Date    Sleep apnea        Past Surgical History:   Past Surgical History:   Procedure Laterality Date    NO PAST SURGERIES         Immunizations:   Immunization History   Administered Date(s) Administered    Influenza, Unspecified 10/18/2017    PPD Test 2008        Medications:     Current Outpatient Medications:     allopurinol (Zyloprim) 100 MG tablet, Take 1 tablet by mouth Daily., Disp: 90 tablet, Rfl: 2    colchicine 0.6 MG tablet, Take 1 tablet by mouth Daily for 90 days. Stop after 90 days.  Change to every other day if you develop loose stools., Disp: 90 tablet, Rfl: 0    ferrous sulfate 324 (65 Fe) MG tablet delayed-release EC tablet, Take 1 tablet by mouth Daily With Breakfast., Disp: 30 tablet, Rfl: 2    losartan (COZAAR) 100 MG tablet, Take 1 tablet by mouth Daily., Disp: 90 tablet, Rfl: 2    pantoprazole (PROTONIX) 40 MG EC tablet, Take 1 tablet by mouth Every Morning Before Breakfast., Disp: 90 tablet, Rfl: 3    rimegepant sulfate ODT (Nurtec) 75 MG disintegrating tablet, Place 1 tablet under the tongue 1 (One) Time As Needed (migraine) for up to 6 doses., Disp: 6 tablet, Rfl: 0    Tirzepatide-Weight Management (ZEPBOUND) 2.5 MG/0.5ML solution auto-injector, Inject 0.5 mL under the skin into the appropriate area as directed 1 (One) Time Per Week. (Patient not taking: Reported on 2025), Disp: 2 mL, Rfl: 0    Allergies:   No Known Allergies    Family History:  "  Family History   Problem Relation Age of Onset    Hypertension Mother     Diabetes Mother     Hypertension Father     Diabetes Father        Social History:   Social History     Socioeconomic History    Marital status: Single   Tobacco Use    Smoking status: Some Days     Current packs/day: 0.25     Average packs/day: 0.3 packs/day for 17.2 years (4.3 ttl pk-yrs)     Types: Cigarettes, Cigars     Start date: 2008    Smokeless tobacco: Never   Vaping Use    Vaping status: Never Used   Substance and Sexual Activity    Alcohol use: No    Drug use: Not Currently     Types: Marijuana     Comment: occasionally    Sexual activity: Yes     Partners: Female         Objective     Vital Signs  /70 (BP Location: Left arm, Patient Position: Sitting, Cuff Size: Large Adult)   Pulse 111   Ht 176 cm (69.29\")   Wt (!) 202 kg (446 lb)   SpO2 97%   BMI 65.31 kg/m²   Estimated body mass index is 65.31 kg/m² as calculated from the following:    Height as of this encounter: 176 cm (69.29\").    Weight as of this encounter: 202 kg (446 lb).            Physical Exam  Constitutional:       General: He is not in acute distress.     Appearance: He is not toxic-appearing.   Pulmonary:      Effort: Pulmonary effort is normal. No respiratory distress.   Abdominal:      General: Abdomen is flat. There is no distension.   Skin:     General: Skin is warm and dry.   Neurological:      Mental Status: He is alert.   Psychiatric:         Mood and Affect: Mood normal.         Behavior: Behavior normal.          Assessment and Plan     1. Essential hypertension  Chronic stable continue losartan, check BMP  - Basic Metabolic Panel; Future    2. HIEU (obstructive sleep apnea)  Chronic stable  Continue CPAP therapy, provided number to bariatrics for weight loss    3. Migraine without status migrainosus, not intractable, unspecified migraine type  Chronic uncontrolled  Nurtec sample given.  Advised to call back if he needs additional maintenance " meds but his migraines are pretty infrequent.        Counseling was given to patient for the following topics: instructions for management.    Follow Up  Return in about 6 months (around 8/27/2025) for Follow Up.    MD MARTHA Isaac [facility]  [address] [address]  [phone]

## 2025-03-25 RX ORDER — FERROUS SULFATE 324(65)MG
324 TABLET, DELAYED RELEASE (ENTERIC COATED) ORAL
Qty: 30 TABLET | Refills: 2 | Status: SHIPPED | OUTPATIENT
Start: 2025-03-25

## 2025-03-25 RX ORDER — COLCHICINE 0.6 MG/1
TABLET ORAL
Qty: 90 TABLET | Refills: 0 | Status: SHIPPED | OUTPATIENT
Start: 2025-03-25 | End: 2025-03-27 | Stop reason: SDUPTHER

## 2025-03-25 NOTE — TELEPHONE ENCOUNTER
Rx Refill Note  Requested Prescriptions     Pending Prescriptions Disp Refills    colchicine 0.6 MG tablet [Pharmacy Med Name: COLCHICINE 0.6MG TABLETS] 90 tablet 0     Sig: TAKE 1 TABLET BY MOUTH DAILY. STOP AFTER 90 DAYS. CHANGE TO EVERY OTHER DAY IF YOU DEVELOP LOOSE STOOLS    ferrous sulfate 324 (65 Fe) MG tablet delayed-release EC tablet [Pharmacy Med Name: FERROUS SULFATE 324MG EC TABS RED] 30 tablet 2     Sig: TAKE 1 TABLET BY MOUTH DAILY WITH BREAKFAST      Last office visit with prescribing clinician: 2/27/2025   Last telemedicine visit with prescribing clinician: Visit date not found   Next office visit with prescribing clinician: 8/28/2025     Courtney Chu MA  03/25/25, 11:44 EDT    I do not see that you have prescribed colchicine.

## 2025-03-26 ENCOUNTER — PRIOR AUTHORIZATION (OUTPATIENT)
Age: 35
End: 2025-03-26
Payer: MEDICAID

## 2025-03-26 ENCOUNTER — TELEPHONE (OUTPATIENT)
Age: 35
End: 2025-03-26

## 2025-03-26 NOTE — TELEPHONE ENCOUNTER
Caller: Murphy Gutierrez Jr.    Relationship to patient: Self      Best call back number: [phone] (Home)     Provider: DR CHIRINOS     Medication PA needed: colchicine 0.6 MG tablet     Reason for call/Prior Auth: PHARMACY SAID THE MEDICATION WAS NOT COVERED BY HIS INSURANCE     PATIENT SAID IT WAS COVERED THE FIRST TIME     [facility] [address] CONFIRMED

## 2025-03-26 NOTE — TELEPHONE ENCOUNTER
Key: [id number]    Drug:  Colchicine 0.6MG tablets    This letter is to notify you that the prior authorization request for COLCHICINE 0.6 MG TABLET has been approved.  Benefits for all services are subject to terms, conditions and eligibility as outlined in the benefit documentation in effect at the time services are provided.  The authorization is effective from 03/26/2025 to 03/26/2026, as long as you are enrolled as   a member of your current health plan.  The request was approved as submitted.

## 2025-03-28 RX ORDER — COLCHICINE 0.6 MG/1
0.6 TABLET ORAL DAILY
Qty: 90 TABLET | Refills: 0 | Status: SHIPPED | OUTPATIENT
Start: 2025-03-28

## 2025-03-28 RX ORDER — ALLOPURINOL 100 MG/1
100 TABLET ORAL DAILY
Qty: 90 TABLET | Refills: 1 | Status: SHIPPED | OUTPATIENT
Start: 2025-03-28

## 2025-03-28 NOTE — TELEPHONE ENCOUNTER
Rx Refill Note  Requested Prescriptions     Pending Prescriptions Disp Refills    allopurinol (Zyloprim) 100 MG tablet 90 tablet 2     Sig: Take 1 tablet by mouth Daily.    rimegepant sulfate ODT (Nurtec) 75 MG disintegrating tablet 6 tablet 0     Sig: Place 1 tablet under the tongue 1 (One) Time As Needed (migraine) for up to 6 doses.    colchicine 0.6 MG tablet 90 tablet 0      Last office visit with prescribing clinician: 2/27/2025   Last telemedicine visit with prescribing clinician: Visit date not found   Next office visit with prescribing clinician: 8/28/2025   {  Martin Thompson Jr, MA  03/28/25, 09:09 EDT    RX sent

## 2025-04-02 NOTE — PROGRESS NOTES
Sleep Clinic Follow Up Note    Chief Complaint  Sleeping Problem and Sleep Apnea (Follow up)    Subjective     History of Present Illness (from previous encounter on 2/7/2025):  Murphy Gutierrez is a 34 y.o. male with obesity who presents to establish care for severe obstructive sleep apnea.  A home sleep test obtained on 1/25/2025 revealed severe obstructive sleep apnea with an AHI of 122.6/h.  Therapy is recommended.     I will place orders for new device and supplies and the best the patient return for follow-up in compliance in 31-90 days.  I have noted the patient will need to use the device more than 4 hours a night, more than 70% of the time in order to remain fully compliant. (End copied text).    Interval History:  Murphy Gutierrez Jr. is a 34 y.o. male returns for follow up and compliance of PAP therapy. The patient was last seen on 2/7/2025 by me. Overall the patient feels poor with regard to therapy. He has had difficulty with the mask leakage. The device appears to be working appropriately. On average the patient sleeps 6 hours per night. The patient wakes 1-2 times per night.     The patient reports the following changes to their medical and medication history since they were last seen:  None    Further details are as follows:      [place] Scale is (out of 24): Total score: 18     Weight:  Current Weight: 450 ,b    Weight change in the last year:  gain: 0 lbs    The patient's relevant past medical, surgical, family, and social history reviewed and updated in Epic as appropriate.    PMH:    Past Medical History:   Diagnosis Date    Sleep apnea      Past Surgical History:   Procedure Laterality Date    NO PAST SURGERIES         No Known Allergies    MEDS:  Prior to Admission medications    Medication Sig Start Date End Date Taking? Authorizing Provider   allopurinol (Zyloprim) 100 MG tablet Take 1 tablet by mouth Daily. 3/28/25   Ortega Corona MD   colchicine 0.6 MG tablet Take 1 tablet by mouth Daily.  "3/28/25   Ortega Corona MD   ferrous sulfate 324 (65 Fe) MG tablet delayed-release EC tablet TAKE 1 TABLET BY MOUTH DAILY WITH BREAKFAST 3/25/25   Ortega Corona MD   losartan (COZAAR) 100 MG tablet Take 1 tablet by mouth Daily. 12/27/24   Ortega Corona MD   pantoprazole (PROTONIX) 40 MG EC tablet Take 1 tablet by mouth Every Morning Before Breakfast. 12/18/24   Ortega Corona MD   rimegepant sulfate ODT (Nurtec) 75 MG disintegrating tablet Place 1 tablet under the tongue 1 (One) Time As Needed (migraine) for up to 6 doses. 3/28/25   Ortega Corona MD   Tirzepatide-Weight Management (ZEPBOUND) 2.5 MG/0.5ML solution auto-injector Inject 0.5 mL under the skin into the appropriate area as directed 1 (One) Time Per Week.  Patient not taking: Reported on 2/27/2025 2/6/25   Ortega Corona MD         FH:  Family History   Problem Relation Age of Onset    Hypertension Mother     Diabetes Mother     Hypertension Father     Diabetes Father        Objective   Vital Signs:  /78 (BP Location: Left arm, Patient Position: Sitting, Cuff Size: Adult)   Pulse 110   Temp 96.4 °F (35.8 °C) (Temporal)   Ht 176 cm (69.29\")   Wt (!) 205 kg (450 lb 14.4 oz)   SpO2 97%   BMI 66.03 kg/m²     Patient's (Body mass index is 66.03 kg/m².) indicates that they are morbidly obese (BMI > 40 or > 35 with obesity - related health condition) with health related conditions that include obstructive sleep apnea . Weight is unchanged. BMI is is above average; BMI management plan is completed. We discussed portion control and increasing exercise.           Physical Exam  Vitals reviewed.   Constitutional:       Appearance: Normal appearance.   HENT:      Head: Normocephalic and atraumatic.      Nose: Nose normal.      Mouth/Throat:      Mouth: Mucous membranes are moist.   Cardiovascular:      Rate and Rhythm: Normal rate and regular rhythm.      Heart sounds: No murmur heard.     No friction rub. No gallop.   Pulmonary:      Effort: " Pulmonary effort is normal. No respiratory distress.      Breath sounds: Normal breath sounds. No wheezing or rhonchi.   Neurological:      Mental Status: He is alert and oriented to person, place, and time.   Psychiatric:         Behavior: Behavior normal.               Result Review :           PAP Report:  AHI: 11.9/h  Days of Usage: 10/60 (17%)  Number of Days Greater than 4 hours: 1/60 (2%)  Average time (days used): 1 hour 9 minutes  95th Percentile Pressure: 10.9 cmH2O  95th percentile leaks: 46.0 L/min  Settings: Auto CPAP-8/18 cm H2O, EPR full-time, EPR level 2, response standard.       Assessment and Plan  Murphy Gutierrez Jr. is a 34 y.o. male who returns for follow-up and compliance of PAP therapy.  The Pap report has been reviewed.  Overall usage is at 17% with compliance 2%.  Patient averaged 1 hour and 9 minutes of therapy.  AHI is elevated at 11.9/h.  He has a history of severe obstructive sleep apnea with an AHI of 122.6/h.  I have recommended he see DME for mask fitting. I will refill the patient's supplies, and I have asked them to return in about 4 months for recheck.    Diagnoses and all orders for this visit:    1. Obstructive sleep apnea, adult (Primary)  -     PAP Therapy    2. Morbid (severe) obesity due to excess calories         The patient continues to use and benefit from PAP therapy.    1. The patient was counseled regarding multimodal approach with healthy nutrition, healthy sleep, regular physical activity, social activities, counseling, and medications. Encouraged to practice lateral sleep position. Avoid alcohol and sedatives close to bedtime.     2.  We will refill supplies x1 year.          Follow Up  Return in about 4 months (around 8/14/2025).  Patient was given instructions and counseling regarding his condition or for health maintenance advice. Please see specific information pulled into the AVS if appropriate.       RADHA Braden, ACNP-BC  Pulmonology, Critical  Care, and Sleep Medicine

## 2025-04-14 ENCOUNTER — OFFICE VISIT (OUTPATIENT)
Dept: SLEEP MEDICINE | Age: 35
End: 2025-04-14
Payer: MEDICAID

## 2025-04-14 VITALS
OXYGEN SATURATION: 97 % | HEART RATE: 110 BPM | TEMPERATURE: 96.4 F | DIASTOLIC BLOOD PRESSURE: 78 MMHG | HEIGHT: 69 IN | WEIGHT: 315 LBS | SYSTOLIC BLOOD PRESSURE: 148 MMHG | BODY MASS INDEX: 46.65 KG/M2

## 2025-04-14 DIAGNOSIS — E66.01 MORBID (SEVERE) OBESITY DUE TO EXCESS CALORIES: ICD-10-CM

## 2025-04-14 DIAGNOSIS — G47.33 OBSTRUCTIVE SLEEP APNEA, ADULT: Primary | ICD-10-CM

## 2025-04-14 PROCEDURE — 3078F DIAST BP <80 MM HG: CPT | Performed by: NURSE PRACTITIONER

## 2025-04-14 PROCEDURE — 99213 OFFICE O/P EST LOW 20 MIN: CPT | Performed by: NURSE PRACTITIONER

## 2025-04-14 PROCEDURE — 3077F SYST BP >= 140 MM HG: CPT | Performed by: NURSE PRACTITIONER

## 2025-04-14 PROCEDURE — 1159F MED LIST DOCD IN RCRD: CPT | Performed by: NURSE PRACTITIONER

## 2025-04-14 PROCEDURE — 1160F RVW MEDS BY RX/DR IN RCRD: CPT | Performed by: NURSE PRACTITIONER

## 2025-05-01 ENCOUNTER — DOCUMENTATION (OUTPATIENT)
Dept: BARIATRICS/WEIGHT MGMT | Facility: CLINIC | Age: 35
End: 2025-05-01
Payer: MEDICAID

## 2025-05-01 ENCOUNTER — OFFICE VISIT (OUTPATIENT)
Dept: BEHAVIORAL HEALTH | Facility: CLINIC | Age: 35
End: 2025-05-01
Payer: MEDICAID

## 2025-05-01 ENCOUNTER — OFFICE VISIT (OUTPATIENT)
Dept: BARIATRICS/WEIGHT MGMT | Facility: CLINIC | Age: 35
End: 2025-05-01
Payer: MEDICAID

## 2025-05-01 VITALS
HEIGHT: 68 IN | WEIGHT: 315 LBS | HEART RATE: 104 BPM | OXYGEN SATURATION: 97 % | TEMPERATURE: 97.3 F | DIASTOLIC BLOOD PRESSURE: 72 MMHG | SYSTOLIC BLOOD PRESSURE: 116 MMHG | RESPIRATION RATE: 18 BRPM | BODY MASS INDEX: 47.74 KG/M2

## 2025-05-01 DIAGNOSIS — I10 ESSENTIAL HYPERTENSION: ICD-10-CM

## 2025-05-01 DIAGNOSIS — E66.01 MORBID OBESITY WITH BMI OF 60.0-69.9, ADULT: ICD-10-CM

## 2025-05-01 DIAGNOSIS — F31.81 BIPOLAR II DISORDER IN FULL REMISSION: ICD-10-CM

## 2025-05-01 DIAGNOSIS — Z72.0 OCCASIONAL TOBACCO SMOKER: Primary | ICD-10-CM

## 2025-05-01 DIAGNOSIS — K21.9 GASTROESOPHAGEAL REFLUX DISEASE WITHOUT ESOPHAGITIS: ICD-10-CM

## 2025-05-01 DIAGNOSIS — Z71.89 ENCOUNTER FOR PSYCHOLOGICAL ASSESSMENT PRIOR TO BARIATRIC SURGERY: ICD-10-CM

## 2025-05-01 DIAGNOSIS — G47.30 SLEEP APNEA, UNSPECIFIED TYPE: ICD-10-CM

## 2025-05-01 DIAGNOSIS — R53.83 FATIGUE, UNSPECIFIED TYPE: ICD-10-CM

## 2025-05-01 DIAGNOSIS — J45.909 ASTHMA, UNSPECIFIED ASTHMA SEVERITY, UNSPECIFIED WHETHER COMPLICATED, UNSPECIFIED WHETHER PERSISTENT: ICD-10-CM

## 2025-05-01 DIAGNOSIS — R10.13 DYSPEPSIA: ICD-10-CM

## 2025-05-01 PROCEDURE — 90791 PSYCH DIAGNOSTIC EVALUATION: CPT | Performed by: PSYCHOLOGIST

## 2025-05-01 RX ORDER — SODIUM CHLORIDE 0.9 % (FLUSH) 0.9 %
3-10 SYRINGE (ML) INJECTION AS NEEDED
OUTPATIENT
Start: 2025-05-01

## 2025-05-01 RX ORDER — SODIUM CHLORIDE 9 MG/ML
150 INJECTION, SOLUTION INTRAVENOUS ONCE
OUTPATIENT
Start: 2025-05-01 | End: 2025-05-01

## 2025-05-01 RX ORDER — SODIUM CHLORIDE 9 MG/ML
40 INJECTION, SOLUTION INTRAVENOUS AS NEEDED
OUTPATIENT
Start: 2025-05-01

## 2025-05-01 RX ORDER — SODIUM CHLORIDE 0.9 % (FLUSH) 0.9 %
3 SYRINGE (ML) INJECTION EVERY 12 HOURS SCHEDULED
OUTPATIENT
Start: 2025-05-01

## 2025-05-01 NOTE — PROGRESS NOTES
PROGRESS NOTE    Data:    Murphy ENCARNACION is a 34 y.o. male who met with the undersigned for a scheduled psychological evaluation from 9:45 - 10:30 am.      Clinical Maneuvering/Intervention:      Chief complaint and history of presenting illness/Problems: struggling with obesity for several years. Despite trying different weight loss plans and diets, the pt reported being unsuccessful in losing weight. A psychological evaluation was conducted in order to assess past and current level of functioning. Areas assessed included, but were not limited to: perception of social support, perception of ability to face and deal with challenges in life (positive functioning), anxiety symptoms, depressive symptoms, perspective on beliefs/belief system, coping skills for stress, intelligence level, addiction issues, etc. Therapeutic rapport was established. Interventions conducted today were geared towards assessing the pt's readiness for weight loss surgery and identifying and psychological contraindications for undergoing such a major life change. Social support was deemed strong (specific to weight loss surgery/weight loss in this manner and in a general sense): mother, siblings, friends, and co-workers. He works full time at [employer] but plans to get a job as a  after he loses some weight. Current psychological struggles were described as low, but included bipolar type II and feeling held back in life by being overweight. He did not endorse experiencing ginette in his history and that he has not felt hypomanic for several years. He can often feel down and depressed when it comes to his weight and having to take several medications. He described his turning point to decide to have weight loss surgery after struggling more and more, then being prescribed five medications by his physician for various health problems. Coping skills for distress and related to undergoing a major life change such as weight loss  surgery/weight loss were deemed strong and included relying on support system as needed, makes a point to follow directions/rules in life, choosing to see good in life, finds humor in things, tends to be a responsible person, maintains quality relationships with others and makes a point to learn what will help him be successful with weight loss surgery. The pt endorsed having characteristics of readiness to undergo major life changes inherent in the journey of weight loss surgery. The pt could speak to the detailed process of becoming ready mentally for this major life change, having suffered enough, and feeling confident about his decision to have this surgery. The pt expressed gratitude for today's visit.     Past Family and Social History:      History of family mental health problems per pt: mother (bipolar)    Psychosocial history: treatment of psychiatric care in the past (N/A), alcohol/substance abuse treatment in the past (N/A) , alcohol/substance abuse problems (N/A), inpatient psychiatric care (N/A).    Mental Status Exam (MSE):  Hygiene:  good  Dress: normal  Attitude:  cooperative and proactive  Motor Activity: normal  Speech: normal  Mood:  level  Affect:  congruent  Thought Processes: normal  Thought Content:  normal  Suicidal Thoughts:  not endorsed  Homicidal Thoughts:  not endorsed  Crisis Safety Plan: not needed   Hallucinations:  none      Patient's Support Network Includes:  family, friends      Progress toward goal: there is evidence to suggest that he is taking measures to improve the quality of his life including seeking weight loss surgery.       Functional Status: moderate to high      Prognosis: good with weight loss surgery    Evaluation, Diagnoses, and Ability/Capacity to Respond to Treatment:      The pt presented with a history of bipolar disorder type II (in remission) and depressed situationally to being overweight (BMI = 67.75, morbid obesity). Results of MSE demonstrated a  functional status of moderate to high. Strengths: belief in self that he will be successful with weight loss surgery, etc (see detailed list of coping skills above). Needed for growth (CPT code requirement for Weaknesses): weight loss.      From a psychological standpoint, the patient presents as a good candidate for bariatric surgery. He is motivated for surgery, has demonstrated readiness for the lifestyle change (including beginning to adjust her eating habits), and appears to have appropriate expectations for preparing for and maintaining success after surgery.    Treatment Plan:      Short term goals: Begin improving her health by following up with his bariatric surgeon in order to receive weight loss surgery as soon as feasible and demonstrate compliance with the recommended diet. Long term goals: reach a healthy weight and experience overall improved mood via taking control of his health.     Guerita Tsai, PhD, LP

## 2025-05-01 NOTE — PROGRESS NOTES
[facility]  [address]  [address]  [facility] [id number] [phone]      Patient  Name:  Murphy Gutierrez Jr.  :  1990      Date of Visit: 2025      Chief Complaint:  weight gain; unable to maintain weight loss      History of Present Illness:  Murphy Gutierrez Jr. is a 34 y.o. male who presents today for evaluation, education and consultation regarding metabolic and bariatric surgery with Dr. Lowe.     Murphy has been overweight for at least 20+ years, has been 35 pounds or more overweight for at least 20 years, has been 100 pounds or more overweight for 20 or more years and started dieting at age N/A.  Previous diet attempts include: Zepbound. The most weight Murphy lost was N/A pounds but was unable to maintain that weight loss.  His maximum lifetime weight is 450 pounds.       Complete history has been obtained and discussed today, as pertinent to metabolic/ bariatric surgery.     Past Medical History:   Diagnosis Date    Asthma     Depression     GERD (gastroesophageal reflux disease)     Takes Protonix, no prior eval.    Gout     Takes Colchicine and Allopurinol    Hypertension     Iron deficiency     Migraines     Takes Nurtec    Pre-diabetes     A1C 6.2    Sleep apnea     Uses a Cpap fully compliant     Past Surgical History:   Procedure Laterality Date    NO PAST SURGERIES      WISDOM TOOTH EXTRACTION Bilateral      Denies personal/family hx bleeding/clotting/anesthesia issues.     No Known Allergies    Current Outpatient Medications:     allopurinol (Zyloprim) 100 MG tablet, Take 1 tablet by mouth Daily., Disp: 90 tablet, Rfl: 1    colchicine 0.6 MG tablet, Take 1 tablet by mouth Daily., Disp: 90 tablet, Rfl: 0    ferrous sulfate 324 (65 Fe) MG tablet delayed-release EC tablet, TAKE 1 TABLET BY MOUTH DAILY WITH BREAKFAST, Disp: 30 tablet, Rfl: 2    losartan (COZAAR) 100 MG tablet, Take 1 tablet by mouth Daily., Disp: 90 tablet, Rfl:  2    pantoprazole (PROTONIX) 40 MG EC tablet, Take 1 tablet by mouth Every Morning Before Breakfast., Disp: 90 tablet, Rfl: 3    rimegepant sulfate ODT (Nurtec) 75 MG disintegrating tablet, Place 1 tablet under the tongue 1 (One) Time As Needed (migraine) for up to 6 doses., Disp: 6 tablet, Rfl: 0    Social History     Socioeconomic History    Marital status: Single   Tobacco Use    Smoking status: Some Days     Current packs/day: 0.25     Average packs/day: 0.3 packs/day for 17.3 years (4.3 ttl pk-yrs)     Types: Cigarettes, Cigars     Start date: 2008     Passive exposure: Current    Smokeless tobacco: Never   Vaping Use    Vaping status: Never Used   Substance and Sexual Activity    Alcohol use: Yes     Comment: Soically    Drug use: Not Currently     Types: Marijuana    Sexual activity: Yes     Partners: Female     Social History     Social History Narrative    Pt lives in [facility], he is single with no children. He works full time @ [employer].        Family History   Problem Relation Age of Onset    Hypertension Mother     Diabetes Mother     Obesity Mother     Sleep apnea Mother     Hypertension Father     Diabetes Father     Hypertension Maternal Grandmother     Diabetes Maternal Grandmother     Hypertension Paternal Grandmother     Diabetes Paternal Grandmother     Heart disease Paternal Grandmother        Review of Systems:  Constitutional:  reports fatigue, weight gain  HEENT:  denies headache, ear pain or loss of hearing, blurred or double vision, nasal discharge or sore throat.  Cardiovascular:  reports HTN and denies HLD, hx heart disease, palpitations, edema, hx DVT.  Respiratory:  reports sleep apnea and denies shortness of breath , COPD, hx PE.  Gastrointestinal:  reports heartburn and denies dysphagia, nausea, vomiting, abdominal pain, diarrhea, constipation, gallbladder issues, liver disease, hx pancreatitis.  Genitourinary: denies renal insufficiency, renal failure.    Musculoskeletal: denies  joint pain, fibromyalgia, arthritis, and autoimmune disease.  Neurological:  reports migraines and denies dizziness, confusion, seizure, stroke.  Psychiatric:  reports hx depression, bipolar disorder   Endocrine: denies glucose intolerance, thyroid disease.  Hematologic: denies bleeding disorder, hx anemia, hx blood transfusion.  Skin: denies rashes, hx MRSA.    Physical Exam:  Vital Signs:  Weight: (!) 202 kg (445 lb 9.6 oz)   Body mass index is 67.75 kg/m².  Temp: 97.3 °F (36.3 °C)   Heart Rate: 104   BP: 116/72     Physical Exam  Vitals reviewed.   Constitutional:       Appearance: Normal appearance. He is normal weight.   Neck:      Comments: Thick neck   Cardiovascular:      Rate and Rhythm: Regular rhythm. Tachycardia present.      Pulses: Normal pulses.   Pulmonary:      Effort: Pulmonary effort is normal.   Abdominal:      General: Bowel sounds are normal.      Palpations: Abdomen is soft.      Comments: No surgical scars present    Musculoskeletal:         General: Normal range of motion.      Cervical back: Normal range of motion.   Skin:     General: Skin is warm.   Neurological:      Mental Status: He is alert and oriented to person, place, and time.   Psychiatric:         Mood and Affect: Mood normal.         Behavior: Behavior normal.         Patient Active Problem List   Diagnosis    Essential hypertension    Chronic gout involving toe without tophus    Iron deficiency    HIEU (obstructive sleep apnea)    Morbid (severe) obesity due to excess calories    Neutrophilic leukocytosis    Microcytic erythrocytes    Gastroesophageal reflux disease without esophagitis    GERD (gastroesophageal reflux disease)       Assessment:  34 y.o. male with medically complicated obesity pursuing sleeve gastrectomy.    Metabolic & Bariatric Surgery is deemed medically necessary given the following: Class 3 Severe Obesity (BMI >=40). Obesity-related health conditions include the following: obstructive sleep apnea and  hypertension. Obesity is worsening. BMI is is above average; BMI management plan is completed. We discussed consulting a Bariatric surgeon.      Plan:  Further evaluation will include: CBC, CMP, Lipids, TSH, HgA1C, Vitamin D, H.Pylori, Pulmonary Function Testing, Gastric Emptying Study, and EGD.    Additional clearances needed prior to surgery will include: Cardiology.     Patient understands that bariatric surgery is not cosmetic surgery but rather a tool to help make a lifelong commitment to lifestyle changes including diet, exercise and behavior modifications.  The patient has been educated today on those expected postoperative lifestyle changes.  Psychological and Nutritional consultations will be completed prior to surgery.  Instructions on how to access [employer] (an internet based site w/ educational surgical videos) were given to the patient.  Recommended perioperative vitamin supplementation was reviewed.  The importance of avoiding ASA/ NSAIDS/ steroids/ tobacco/nicotine/ hormones/ immunomodulators perioperatively was discussed in detail.  All questions/concerns have been addressed.      Further input to follow pending the above.      3 minutes was spent discussing the risk of tobacco use, nicotine use and second hand tobacco exposure with regard to bariatric surgery.  Patient is understanding of the risks of continued use and agreeable to stop tobacco and nicotine products.            RADHA Baxter

## 2025-05-01 NOTE — PROGRESS NOTES
"Weight Loss Surgery  Presurgical Nutrition Assessment     Murphy Gutierrez Jr.  05/01/2025  [id number]  [id number]  1990   male    Surgery desired: LSG (sleeve gastrectomy)     HEIGHT: 172.7 cm (68\")   WEIGHT: 202 kg (445.5 #)   BMI:  67.75    Highest weight ever: 204.5 kg (450 #)      No Known Allergies    Current Outpatient Medications:     allopurinol (Zyloprim) 100 MG tablet, Take 1 tablet by mouth Daily., Disp: 90 tablet, Rfl: 1    colchicine 0.6 MG tablet, Take 1 tablet by mouth Daily., Disp: 90 tablet, Rfl: 0    ferrous sulfate 324 (65 Fe) MG tablet delayed-release EC tablet, TAKE 1 TABLET BY MOUTH DAILY WITH BREAKFAST, Disp: 30 tablet, Rfl: 2    losartan (COZAAR) 100 MG tablet, Take 1 tablet by mouth Daily., Disp: 90 tablet, Rfl: 2    pantoprazole (PROTONIX) 40 MG EC tablet, Take 1 tablet by mouth Every Morning Before Breakfast., Disp: 90 tablet, Rfl: 3    rimegepant sulfate ODT (Nurtec) 75 MG disintegrating tablet, Place 1 tablet under the tongue 1 (One) Time As Needed (migraine) for up to 6 doses., Disp: 6 tablet, Rfl: 0    Tirzepatide-Weight Management (ZEPBOUND) 2.5 MG/0.5ML solution auto-injector, Inject 0.5 mL under the skin into the appropriate area as directed 1 (One) Time Per Week., Disp: 2 mL, Rfl: 0      Subjective information: Patient reported during nutrition consult that he was somewhat familiar with post-bariatric surgery diets because his mother had a sleeve gastrectomy when he was still in .  He has never participated in a structured weight loss program himself.  He does read food labels and can capably locate grams of protein and carb per serving on them.  He states that as a former  working to prepare food for homeless people, and working in other areas of , has had training on serving sizes.  As a current [employer] employee, this knowledge reinforced   In his training there. He works the evening shift from 6 pm to 2 am or from 8 pm to 4 am, eating dinner in the " early morning hours, then sleeping until next shift.       Nutrition Recall   Example of Usual 24 hour intake:     Breakfast: before going to work:  eggs with lyman, hash browns and toast, with unflavored cold water to drink.     Lunch: skips lunch    Dinner: after getting off work at 2 to 4 am, eats out  at any one of a variety of places: [employer], [employer], [employer], [employer], [employer], [employer] [place], [employer], [employer], [employer].      Snacks:Patient states that he doesn't really snack, but does awaken during sleeping hours to eat, or before he goes to bed after work.  He adds that this is his biggest problem when it comes to weight control.      Beverages of Choice: Primarily ice-cold plain water. Very rarely drinks soda.  No coffee or tea, sweet or non-sweet    Food Allergies or Intolerances: none stated or recorded          Exercise / Activity: no personal planned activity at this time.       Assessment of Nutritional Adequacy, Excessive Intake or Deficiencies:        Protein intake is marginal to insufficient to ensure successful weight loss.  Large portions at meal times and after he gets off work may cause protein needs to be approached.                                        Processed / simple Carbohydrate intake is: No sweets as desserts or snacks.  But intake of starchy foods eg breads,  potatoes, pizza crusts, etc, is high.                                        Balance of diet with a variety of fruits and vegetables is: diet is not balanced at all.                                                        Reliance on restaurant food including fast food is: very high - eats out daily at fast food restaurants - see above                                                                           Ingestion of sweet beverages eg soda, sweet tea, fruit juice: not an issue      Education    Provided Nutrition Guidelines for Bariatric and Metabolic Surgery   Reviewed guidelines for higher protein, limited  carbohydrate diet to promote weight loss. Demonstrated means of counting protein and carbohydrate grams.   Educated patient to wisely choose an appropriate protein supplement beverage for the post-surgery liquid diet.  Provided product guidelines and examples.    Explained importance of goal setting to help in changing eating behaviors that are not conducive to weight loss.  Specific macronutrient goals as below.   Provided follow-up options for support, including contact information for dietitians here.     Discussed importance of tracking grams of protein and carbohydrate in diet.  Web-based support information and apps for smart phones and computers given.        Nutrition Goals   Protein goal: 100 grams per day in three regular balanced meals and two to three high protein snacks each day, to ensure desired weight loss.   Carbohydrate goal:  100-140 grams per day  Beverage goal: Appropriate non-carbonated beverage intake.  Patient to wean self off of any sweet beverages, including soda.    Exercise Goals  Continue current exercise routine, if appropriate, and obtain approval from caregiver if physically limited for any reason.   Start activity plan per PCP/specialist advice if not currently exercising.     Recommend that team proceed with surgery and follow per protocol.   Shyla Wheeler RD  05/01/2025  08:48 EDT

## 2025-05-02 ENCOUNTER — PATIENT ROUNDING (BHMG ONLY) (OUTPATIENT)
Dept: BARIATRICS/WEIGHT MGMT | Facility: CLINIC | Age: 35
End: 2025-05-02
Payer: MEDICAID

## 2025-05-02 LAB
25(OH)D3+25(OH)D2 SERPL-MCNC: 19.1 NG/ML (ref 30–100)
BASOPHILS # BLD AUTO: 0 X10E3/UL (ref 0–0.2)
BASOPHILS NFR BLD AUTO: 0 %
EOSINOPHIL # BLD AUTO: 0.2 X10E3/UL (ref 0–0.4)
EOSINOPHIL NFR BLD AUTO: 2 %
ERYTHROCYTE [DISTWIDTH] IN BLOOD BY AUTOMATED COUNT: 13.5 % (ref 11.6–15.4)
HBA1C MFR BLD: 6.7 % (ref 4.8–5.6)
HCT VFR BLD AUTO: 44.5 % (ref 37.5–51)
HGB BLD-MCNC: 12.9 G/DL (ref 13–17.7)
IMM GRANULOCYTES # BLD AUTO: 0 X10E3/UL (ref 0–0.1)
IMM GRANULOCYTES NFR BLD AUTO: 0 %
LYMPHOCYTES # BLD AUTO: 2.5 X10E3/UL (ref 0.7–3.1)
LYMPHOCYTES NFR BLD AUTO: 23 %
MCH RBC QN AUTO: 22.4 PG (ref 26.6–33)
MCHC RBC AUTO-ENTMCNC: 29 G/DL (ref 31.5–35.7)
MCV RBC AUTO: 77 FL (ref 79–97)
MONOCYTES # BLD AUTO: 0.6 X10E3/UL (ref 0.1–0.9)
MONOCYTES NFR BLD AUTO: 5 %
NEUTROPHILS # BLD AUTO: 7.2 X10E3/UL (ref 1.4–7)
NEUTROPHILS NFR BLD AUTO: 70 %
PLATELET # BLD AUTO: 248 X10E3/UL (ref 150–450)
RBC # BLD AUTO: 5.76 X10E6/UL (ref 4.14–5.8)
UREA BREATH TEST QL: NEGATIVE
WBC # BLD AUTO: 10.5 X10E3/UL (ref 3.4–10.8)

## 2025-05-02 NOTE — PROGRESS NOTES
A [facility] message has been sent to the patient for PATIENT ROUNDING for [facility] - Bariatric Surgery/[facility] Weight Mgmt.

## 2025-05-05 ENCOUNTER — TELEPHONE (OUTPATIENT)
Age: 35
End: 2025-05-05
Payer: MEDICAID

## 2025-05-05 NOTE — TELEPHONE ENCOUNTER
Caller: Murphy Gutierrez Jr.    Relationship: Self    Best call back number: [phone]     Caller requesting test results: SELF    What test was performed: BLOOD WORK    When was the test performed: 05/01/25    [facility]    Additional notes: PATIENT RECEIVED RESULTS BACK VIA [facility] AND HAS SEEN HIS A1-C RESULTS. HE IS VERY CONCERNED ABOUT THIS NUMBER. PLEASE CALL BACK TO ADVISE.

## 2025-05-05 NOTE — TELEPHONE ENCOUNTER
Sent patient message via [facility] recommending scheduling an appointment with Dr. Corona to discuss medications for treatment of T2DM

## 2025-05-06 ENCOUNTER — RESULTS FOLLOW-UP (OUTPATIENT)
Dept: BARIATRICS/WEIGHT MGMT | Facility: CLINIC | Age: 35
End: 2025-05-06
Payer: MEDICAID

## 2025-05-06 ENCOUNTER — OFFICE VISIT (OUTPATIENT)
Age: 35
End: 2025-05-06
Payer: MEDICAID

## 2025-05-06 VITALS
HEIGHT: 68 IN | SYSTOLIC BLOOD PRESSURE: 118 MMHG | DIASTOLIC BLOOD PRESSURE: 82 MMHG | BODY MASS INDEX: 47.74 KG/M2 | OXYGEN SATURATION: 95 % | WEIGHT: 315 LBS | HEART RATE: 96 BPM

## 2025-05-06 DIAGNOSIS — G47.33 OSA (OBSTRUCTIVE SLEEP APNEA): ICD-10-CM

## 2025-05-06 DIAGNOSIS — I10 ESSENTIAL HYPERTENSION: ICD-10-CM

## 2025-05-06 DIAGNOSIS — E66.01 MORBID (SEVERE) OBESITY DUE TO EXCESS CALORIES: ICD-10-CM

## 2025-05-06 DIAGNOSIS — E11.65 TYPE 2 DIABETES MELLITUS WITH HYPERGLYCEMIA, WITHOUT LONG-TERM CURRENT USE OF INSULIN: Primary | ICD-10-CM

## 2025-05-06 RX ORDER — ERGOCALCIFEROL 1.25 MG/1
50000 CAPSULE, LIQUID FILLED ORAL
Qty: 12 CAPSULE | Refills: 0 | Status: SHIPPED | OUTPATIENT
Start: 2025-05-06 | End: 2025-07-23

## 2025-05-06 RX ORDER — AVOBENZONE, HOMOSALATE, OCTISALATE, OCTOCRYLENE 30; 40; 45; 26 MG/ML; MG/ML; MG/ML; MG/ML
CREAM TOPICAL
Qty: 100 EACH | Refills: 3 | Status: SHIPPED | OUTPATIENT
Start: 2025-05-06

## 2025-05-06 RX ORDER — BLOOD-GLUCOSE METER
KIT MISCELLANEOUS
Qty: 1 EACH | Refills: 0 | Status: SHIPPED | OUTPATIENT
Start: 2025-05-06

## 2025-05-06 RX ORDER — GLUCOSAMINE HCL/CHONDROITIN SU 500-400 MG
1 CAPSULE ORAL DAILY
Qty: 100 EACH | Refills: 11 | Status: SHIPPED | OUTPATIENT
Start: 2025-05-06

## 2025-05-06 NOTE — PROGRESS NOTES
Office Note     Name: Murphy Gutierrez Jr.    : 1990     MRN: [id number]     Chief Complaint  Results (discussion)    Subjective     History of Present Illness:  Murphy Gutierrez Jr. is a 34 y.o. male who presents today for follow up on results from his bariatric surgery intake.  He now has type 2 diabetes mellitus.  He has no other concerns at this time.  Still planning to undergo surgery in the future but it is in the early stages of planning.      Past Medical History:   Past Medical History:   Diagnosis Date    Asthma     Depression     GERD (gastroesophageal reflux disease)     Takes Protonix, no prior eval.    Gout     Takes Colchicine and Allopurinol    Hypertension     Iron deficiency     Migraines     Takes Nurtec    Pre-diabetes     A1C 6.2    Sleep apnea     Uses a Cpap fully compliant       Past Surgical History:   Past Surgical History:   Procedure Laterality Date    NO PAST SURGERIES      WISDOM TOOTH EXTRACTION Bilateral 2009       Immunizations:   Immunization History   Administered Date(s) Administered    Influenza, Unspecified 10/18/2017    PPD Test 2008        Medications:     Current Outpatient Medications:     allopurinol (Zyloprim) 100 MG tablet, Take 1 tablet by mouth Daily., Disp: 90 tablet, Rfl: 1    colchicine 0.6 MG tablet, Take 1 tablet by mouth Daily., Disp: 90 tablet, Rfl: 0    ferrous sulfate 324 (65 Fe) MG tablet delayed-release EC tablet, TAKE 1 TABLET BY MOUTH DAILY WITH BREAKFAST, Disp: 30 tablet, Rfl: 2    losartan (COZAAR) 100 MG tablet, Take 1 tablet by mouth Daily., Disp: 90 tablet, Rfl: 2    pantoprazole (PROTONIX) 40 MG EC tablet, Take 1 tablet by mouth Every Morning Before Breakfast., Disp: 90 tablet, Rfl: 3    rimegepant sulfate ODT (Nurtec) 75 MG disintegrating tablet, Place 1 tablet under the tongue 1 (One) Time As Needed (migraine) for up to 6 doses., Disp: 6 tablet, Rfl: 0    vitamin D (ERGOCALCIFEROL) 1.25 MG (42422 UT) capsule capsule, Take 1  "capsule by mouth Every 7 (Seven) Days for 12 doses., Disp: 12 capsule, Rfl: 0    Glucose Blood (Blood Glucose Test) strip, 1 each by In Vitro route Daily., Disp: 100 each, Rfl: 11    glucose monitor monitoring kit, Please provide glucometer to check glucose Daily for DM2., Disp: 1 each, Rfl: 0    Lancets misc, Please provide lancets to check glucose Daily for DM2, Disp: 100 each, Rfl: 3    Tirzepatide 2.5 MG/0.5ML solution auto-injector, Inject 2.5 mg under the skin into the appropriate area as directed 1 (One) Time Per Week., Disp: 2 mL, Rfl: 0    Tirzepatide 5 MG/0.5ML solution auto-injector, Inject 5 mg under the skin into the appropriate area as directed 1 (One) Time Per Week., Disp: 2 mL, Rfl: 0    Allergies:   No Known Allergies    Family History:   Family History   Problem Relation Age of Onset    Hypertension Mother     Diabetes Mother     Obesity Mother     Sleep apnea Mother     Hypertension Father     Diabetes Father     Hypertension Maternal Grandmother     Diabetes Maternal Grandmother     Hypertension Paternal Grandmother     Diabetes Paternal Grandmother     Heart disease Paternal Grandmother        Social History:   Social History     Socioeconomic History    Marital status: Single   Tobacco Use    Smoking status: Some Days     Current packs/day: 0.25     Average packs/day: 0.3 packs/day for 17.3 years (4.3 ttl pk-yrs)     Types: Cigarettes, Cigars     Start date: 2008     Passive exposure: Current    Smokeless tobacco: Never   Vaping Use    Vaping status: Never Used   Substance and Sexual Activity    Alcohol use: Yes     Comment: Soically    Drug use: Not Currently     Types: Marijuana    Sexual activity: Yes     Partners: Female         Objective     Vital Signs  /82 (BP Location: Left arm, Patient Position: Sitting, Cuff Size: Large Adult)   Pulse 96   Ht 172.7 cm (67.99\")   Wt (!) 203 kg (448 lb 8 oz)   SpO2 95%   BMI 68.21 kg/m²   Estimated body mass index is 68.21 kg/m² as calculated " "from the following:    Height as of this encounter: 172.7 cm (67.99\").    Weight as of this encounter: 203 kg (448 lb 8 oz).            Physical Exam  Constitutional:       General: He is not in acute distress.     Appearance: He is not toxic-appearing.   Pulmonary:      Effort: Pulmonary effort is normal. No respiratory distress.   Abdominal:      General: Abdomen is flat. There is no distension.   Skin:     General: Skin is warm and dry.   Neurological:      Mental Status: He is alert.   Psychiatric:         Mood and Affect: Mood normal.         Behavior: Behavior normal.          Assessment and Plan     1. Type 2 diabetes mellitus with hyperglycemia, without long-term current use of insulin  Chronic stable  A1c 6.7%  Rx glucometer  Sample of mounjaro  Rx mounjaro 5 mg, aggressively step up  He has obesity with HIEU, hypertension and would benefit from GLP-1 for reduction of cardiovascular risk.    - glucose monitor monitoring kit; Please provide glucometer to check glucose Daily for DM2.  Dispense: 1 each; Refill: 0  - Glucose Blood (Blood Glucose Test) strip; 1 each by In Vitro route Daily.  Dispense: 100 each; Refill: 11  - Lancets misc; Please provide lancets to check glucose Daily for DM2  Dispense: 100 each; Refill: 3  - Tirzepatide 2.5 MG/0.5ML solution auto-injector; Inject 2.5 mg under the skin into the appropriate area as directed 1 (One) Time Per Week.  Dispense: 2 mL; Refill: 0  - Tirzepatide 5 MG/0.5ML solution auto-injector; Inject 5 mg under the skin into the appropriate area as directed 1 (One) Time Per Week.  Dispense: 2 mL; Refill: 0    2. HIEU (obstructive sleep apnea)  Chronic stable on CPAP    3. Morbid (severe) obesity due to excess calories  Planning bariatric surgery, Rx mounjaro as above    4. Essential hypertension  Chronic stable continue losartan       Counseling was given to patient for the following topics: instructions for management.    Follow Up  No follow-ups on file.    Nvaid " MD MARTHA Corona [facility]  [address] [address] [phone]

## 2025-05-07 ENCOUNTER — PRIOR AUTHORIZATION (OUTPATIENT)
Age: 35
End: 2025-05-07
Payer: MEDICAID

## 2025-05-08 ENCOUNTER — PRIOR AUTHORIZATION (OUTPATIENT)
Age: 35
End: 2025-05-08
Payer: MEDICAID

## 2025-05-08 RX ORDER — SEMAGLUTIDE 1.34 MG/ML
0.5 INJECTION, SOLUTION SUBCUTANEOUS WEEKLY
Qty: 3 ML | Refills: 0 | Status: SHIPPED | OUTPATIENT
Start: 2025-05-08

## 2025-05-08 NOTE — TELEPHONE ENCOUNTER
PA; approved    The request has been approved. The authorization is effective from 05/08/2025 to 11/07/2025, as long as the member is enrolled in their current health plan. The request was approved as submitted. A written notification letter will follow with additional details.. Authorization Expiration Date: November 7, 2025.

## 2025-05-15 ENCOUNTER — OFFICE VISIT (OUTPATIENT)
Dept: CARDIOLOGY | Facility: CLINIC | Age: 35
End: 2025-05-15
Payer: MEDICAID

## 2025-05-15 VITALS
DIASTOLIC BLOOD PRESSURE: 70 MMHG | OXYGEN SATURATION: 97 % | BODY MASS INDEX: 46.65 KG/M2 | WEIGHT: 315 LBS | HEART RATE: 99 BPM | HEIGHT: 69 IN | SYSTOLIC BLOOD PRESSURE: 126 MMHG

## 2025-05-15 DIAGNOSIS — E78.5 HYPERLIPIDEMIA, UNSPECIFIED HYPERLIPIDEMIA TYPE: Primary | ICD-10-CM

## 2025-05-15 DIAGNOSIS — E11.9 TYPE 2 DIABETES MELLITUS WITHOUT COMPLICATION, WITHOUT LONG-TERM CURRENT USE OF INSULIN: ICD-10-CM

## 2025-05-15 RX ORDER — BLOOD-GLUCOSE METER
EACH MISCELLANEOUS
COMMUNITY
Start: 2025-05-06

## 2025-05-15 NOTE — PROGRESS NOTES
"Chief Complaint  Establish Care (New Patient)      Subjective   History of Present Illness    Problem List  -Preop for bariatric surgery  -HTN  -DM  -Morbid obesity  -EKG mild sinus tachycardia with non specific changes  -echo normal in 2023    Mr. Gutierrez is a being seen for preop for bariatric surgery.  Able to walk his dogs 3 times daily and do stairs.  Limiting factor is back pain.  No CV complaints.  ROS negative except for the above.         Objective   Vital Signs:  Vitals:    05/15/25 0952   BP: 126/70   Pulse: 99   SpO2: 97%     Estimated body mass index is 65.72 kg/m² as calculated from the following:    Height as of this encounter: 175.3 cm (69\").    Weight as of this encounter: 202 kg (445 lb).       Physical Exam  HENT:      Head: Normocephalic.   Eyes:      Extraocular Movements: Extraocular movements intact.   Cardiovascular:      Rate and Rhythm: Normal rate and regular rhythm.      Heart sounds: No murmur heard.     No gallop.   Pulmonary:      Breath sounds: Normal breath sounds.   Abdominal:      Palpations: Abdomen is soft.   Musculoskeletal:      Right lower leg: No edema.      Left lower leg: No edema.   Skin:     General: Skin is warm and dry.   Neurological:      General: No focal deficit present.      Mental Status: He is alert.   Psychiatric:         Mood and Affect: Mood normal.           ECG 12 Lead    Date/Time: 5/15/2025 10:28 AM  Performed by: Gigi Miranda MD    Authorized by: Gigi Miranda MD  Rhythm: sinus tachycardia  Other findings: non-specific ST-T wave changes           Assessment   -Preop for bariatric surgery  -HTN  -DM  -Morbid obesity  -EKG mild sinus tachycardia with non specific changes  -echo normal in 2023    Plan   -can proceed with bariatric surgery without further CV eval.  -consider CT calcium scoring between age 40-45.    Return if symptoms worsen or fail to improve.  Gigi Miranda MD      "

## 2025-06-04 ENCOUNTER — TELEPHONE (OUTPATIENT)
Age: 35
End: 2025-06-04
Payer: MEDICAID

## 2025-06-04 NOTE — TELEPHONE ENCOUNTER
Patient called and said that when he took his Ozempic shot, the medication didn't go in. He said he didn't feel the needle and the medicine dripped out.

## 2025-06-06 ENCOUNTER — OFFICE VISIT (OUTPATIENT)
Age: 35
End: 2025-06-06
Payer: MEDICAID

## 2025-06-06 VITALS
WEIGHT: 315 LBS | HEART RATE: 88 BPM | OXYGEN SATURATION: 95 % | DIASTOLIC BLOOD PRESSURE: 82 MMHG | BODY MASS INDEX: 46.65 KG/M2 | HEIGHT: 69 IN | SYSTOLIC BLOOD PRESSURE: 122 MMHG

## 2025-06-06 DIAGNOSIS — H60.502 ACUTE OTITIS EXTERNA OF LEFT EAR, UNSPECIFIED TYPE: ICD-10-CM

## 2025-06-06 DIAGNOSIS — I10 ESSENTIAL HYPERTENSION: ICD-10-CM

## 2025-06-06 DIAGNOSIS — E11.65 TYPE 2 DIABETES MELLITUS WITH HYPERGLYCEMIA, WITHOUT LONG-TERM CURRENT USE OF INSULIN: ICD-10-CM

## 2025-06-06 DIAGNOSIS — E66.01 MORBID (SEVERE) OBESITY DUE TO EXCESS CALORIES: ICD-10-CM

## 2025-06-06 DIAGNOSIS — H66.005 RECURRENT ACUTE SUPPURATIVE OTITIS MEDIA WITHOUT SPONTANEOUS RUPTURE OF LEFT TYMPANIC MEMBRANE: Primary | ICD-10-CM

## 2025-06-06 PROCEDURE — 82043 UR ALBUMIN QUANTITATIVE: CPT | Performed by: STUDENT IN AN ORGANIZED HEALTH CARE EDUCATION/TRAINING PROGRAM

## 2025-06-06 PROCEDURE — 82570 ASSAY OF URINE CREATININE: CPT | Performed by: STUDENT IN AN ORGANIZED HEALTH CARE EDUCATION/TRAINING PROGRAM

## 2025-06-06 RX ORDER — CIPROFLOXACIN AND DEXAMETHASONE 3; 1 MG/ML; MG/ML
4 SUSPENSION/ DROPS AURICULAR (OTIC) 2 TIMES DAILY
Qty: 7.5 ML | Refills: 2 | Status: SHIPPED | OUTPATIENT
Start: 2025-06-06

## 2025-06-06 NOTE — PROGRESS NOTES
Office Note     Name: Murphy Gutierrez Jr.    : 1990     MRN: [id number]     Chief Complaint  Diabetes (Follow up, how to use Ozempic) and Earache    Subjective     History of Present Illness:  Murphy Gutierrez Jr. is a 34 y.o. male who presents today for follow-up of diabetes and complaint of earache and difficulty using the Ozempic.  He reports starting his Ozempic twice with medication running out of the pen.  He does not believe he received any injection of the Ozempic with these doses.  Also reports left ear pain consistent with prior otitis externa episodes    Past Medical History:   Past Medical History:   Diagnosis Date    Asthma     Depression     GERD (gastroesophageal reflux disease)     Takes Protonix, no prior eval.    Gout     Takes Colchicine and Allopurinol    Hypertension     Iron deficiency     Migraines     Takes Nurtec    Pre-diabetes     A1C 6.2    Sleep apnea     Uses a Cpap fully compliant       Past Surgical History:   Past Surgical History:   Procedure Laterality Date    NO PAST SURGERIES      WISDOM TOOTH EXTRACTION Bilateral 2009       Immunizations:   Immunization History   Administered Date(s) Administered    Influenza, Unspecified 10/18/2017    PPD Test 2008        Medications:     Current Outpatient Medications:     allopurinol (Zyloprim) 100 MG tablet, Take 1 tablet by mouth Daily., Disp: 90 tablet, Rfl: 1    Blood Glucose Monitoring Suppl (ONE TOUCH ULTRA 2) w/Device kit, USE TO CHECK BLOOD GLUCOSE, Disp: , Rfl:     colchicine 0.6 MG tablet, Take 1 tablet by mouth Daily., Disp: 90 tablet, Rfl: 0    ferrous sulfate 324 (65 Fe) MG tablet delayed-release EC tablet, TAKE 1 TABLET BY MOUTH DAILY WITH BREAKFAST, Disp: 30 tablet, Rfl: 2    Glucose Blood (Blood Glucose Test) strip, 1 each by In Vitro route Daily., Disp: 100 each, Rfl: 11    glucose monitor monitoring kit, Please provide glucometer to check glucose Daily for DM2., Disp: 1 each, Rfl: 0    Lancets misc,  Please provide lancets to check glucose Daily for DM2, Disp: 100 each, Rfl: 3    losartan (COZAAR) 100 MG tablet, Take 1 tablet by mouth Daily., Disp: 90 tablet, Rfl: 2    pantoprazole (PROTONIX) 40 MG EC tablet, Take 1 tablet by mouth Every Morning Before Breakfast., Disp: 90 tablet, Rfl: 3    rimegepant sulfate ODT (Nurtec) 75 MG disintegrating tablet, Place 1 tablet under the tongue 1 (One) Time As Needed (migraine) for up to 6 doses., Disp: 6 tablet, Rfl: 0    Semaglutide,0.25 or 0.5MG/DOS, (Ozempic, 0.25 or 0.5 MG/DOSE,) 2 MG/1.5ML solution pen-injector, Inject 0.5 mg under the skin into the appropriate area as directed 1 (One) Time Per Week., Disp: 3 mL, Rfl: 0    vitamin D (ERGOCALCIFEROL) 1.25 MG (02240 UT) capsule capsule, Take 1 capsule by mouth Every 7 (Seven) Days for 12 doses., Disp: 12 capsule, Rfl: 0    amoxicillin-clavulanate (AUGMENTIN) 875-125 MG per tablet, Take 1 tablet by mouth 2 (Two) Times a Day for 7 days., Disp: 14 tablet, Rfl: 0    ciprofloxacin-dexAMETHasone (Ciprodex) 0.3-0.1 % otic suspension, Administer 4 drops into the left ear 2 (Two) Times a Day., Disp: 7.5 mL, Rfl: 2    Tirzepatide 2.5 MG/0.5ML solution auto-injector, Inject 2.5 mg under the skin into the appropriate area as directed 1 (One) Time Per Week. (Patient not taking: Reported on 6/6/2025), Disp: 2 mL, Rfl: 0    Allergies:   No Known Allergies    Family History:   Family History   Problem Relation Age of Onset    Hypertension Mother     Diabetes Mother     Obesity Mother     Sleep apnea Mother     Hypertension Father     Diabetes Father     Hypertension Maternal Grandmother     Diabetes Maternal Grandmother     Hypertension Paternal Grandmother     Diabetes Paternal Grandmother     Heart disease Paternal Grandmother        Social History:   Social History     Socioeconomic History    Marital status: Single   Tobacco Use    Smoking status: Some Days     Current packs/day: 0.25     Average packs/day: 0.3 packs/day for 17.4  "years (4.4 ttl pk-yrs)     Types: Cigarettes, Cigars     Start date: 2008     Passive exposure: Current    Smokeless tobacco: Never   Vaping Use    Vaping status: Never Used   Substance and Sexual Activity    Alcohol use: Yes     Comment: social    Drug use: Not Currently     Types: Marijuana     Comment: in the past    Sexual activity: Yes     Partners: Female         Objective     Vital Signs  /82 (BP Location: Left arm, Patient Position: Sitting, Cuff Size: Large Adult)   Pulse 88   Ht 175.3 cm (69.02\")   Wt (!) 201 kg (444 lb 3.2 oz)   SpO2 95%   BMI 65.57 kg/m²   Estimated body mass index is 65.57 kg/m² as calculated from the following:    Height as of this encounter: 175.3 cm (69.02\").    Weight as of this encounter: 201 kg (444 lb 3.2 oz).            Physical Exam  Constitutional:       General: He is not in acute distress.     Appearance: He is not toxic-appearing.   HENT:      Ears:      Comments: Left TM with loss of light reflex, significant erythema, opacification.  There is also erythema of the ear canal and pain with insertion of the otoscope  Pulmonary:      Effort: Pulmonary effort is normal. No respiratory distress.   Abdominal:      General: Abdomen is flat. There is no distension.   Skin:     General: Skin is warm and dry.   Neurological:      Mental Status: He is alert.   Psychiatric:         Mood and Affect: Mood normal.         Behavior: Behavior normal.          Assessment and Plan     1. Recurrent acute suppurative otitis media without spontaneous rupture of left tympanic membrane  Augmentin ordered, will do drops as well given some symptoms of external otitis  - amoxicillin-clavulanate (AUGMENTIN) 875-125 MG per tablet; Take 1 tablet by mouth 2 (Two) Times a Day for 7 days.  Dispense: 14 tablet; Refill: 0    2. Essential hypertension  Chronic, stable  - Continue current dose of losartan    3. Morbid (severe) obesity due to excess calories  Chronic, stable  - He wants to try weight " loss with GLP-1 prior to surgery, advised to contact bariatric surgeon about slowing appointments down    4. Type 2 diabetes mellitus with hyperglycemia, without long-term current use of insulin  Chronic stable, urine micro ordered  Continue ozempic    5. Acute otitis externa of left ear, unspecified type  Rx ciproxex  - ciprofloxacin-dexAMETHasone (Ciprodex) 0.3-0.1 % otic suspension; Administer 4 drops into the left ear 2 (Two) Times a Day.  Dispense: 7.5 mL; Refill: 2       Counseling was given to patient for the following topics: instructions for management.    Follow Up  No follow-ups on file.    MD FILOMENA IsaacE [facility]  [address] [address] [phone]

## 2025-06-06 NOTE — TELEPHONE ENCOUNTER
Patient came in for office visit with Dr Corona   It was addressed at time of visit and was shown in office how to do the injection of the ozempic

## 2025-06-07 LAB
ALBUMIN UR-MCNC: 3 MG/DL
CREAT UR-MCNC: 111 MG/DL
MICROALBUMIN/CREAT UR: 27 MG/G (ref 0–29)

## 2025-06-23 DIAGNOSIS — I10 ESSENTIAL HYPERTENSION: ICD-10-CM

## 2025-06-23 RX ORDER — SEMAGLUTIDE 1.34 MG/ML
0.5 INJECTION, SOLUTION SUBCUTANEOUS WEEKLY
Qty: 3 ML | Refills: 0 | Status: CANCELLED | OUTPATIENT
Start: 2025-06-23

## 2025-06-24 RX ORDER — SEMAGLUTIDE 1.34 MG/ML
1 INJECTION, SOLUTION SUBCUTANEOUS WEEKLY
Qty: 3 ML | Refills: 5 | Status: SHIPPED | OUTPATIENT
Start: 2025-06-24

## 2025-06-24 RX ORDER — LOSARTAN POTASSIUM 100 MG/1
100 TABLET ORAL DAILY
Qty: 90 TABLET | Refills: 1 | Status: SHIPPED | OUTPATIENT
Start: 2025-06-24

## 2025-06-24 NOTE — TELEPHONE ENCOUNTER
Rx Refill Note  Requested Prescriptions     Pending Prescriptions Disp Refills    losartan (COZAAR) 100 MG tablet 90 tablet 2     Sig: Take 1 tablet by mouth Daily.      Last office visit with prescribing clinician: 6/6/2025   Last telemedicine visit with prescribing clinician: Visit date not found   Next office visit with prescribing clinician: 6/23/2025     Courtney Chu MA  06/24/25, 07:30 EDT    Rx sent

## 2025-06-24 NOTE — TELEPHONE ENCOUNTER
Rx Refill Note  Requested Prescriptions     Pending Prescriptions Disp Refills    Semaglutide,0.25 or 0.5MG/DOS, (Ozempic, 0.25 or 0.5 MG/DOSE,) 2 MG/1.5ML solution pen-injector 3 mL 0     Sig: Inject 0.5 mg under the skin into the appropriate area as directed 1 (One) Time Per Week.      Last office visit with prescribing clinician: 6/6/2025   Last telemedicine visit with prescribing clinician: Visit date not found   Next office visit with prescribing clinician: 8/28/2025     Patient message:   Alison Reynolds I wanted to up my dosage this time around on the ozempic I know you told me to ask if I wanted to     Courtney Chu MA  06/24/25, 07:31 EDT

## 2025-06-30 RX ORDER — LOSARTAN POTASSIUM 50 MG/1
50 TABLET ORAL DAILY
Qty: 90 TABLET | Refills: 1 | OUTPATIENT
Start: 2025-06-30

## 2025-06-30 NOTE — TELEPHONE ENCOUNTER
The original prescription was discontinued on 12/27/2024 by Ortega Corona MD. Renewing this prescription may not be appropriate.

## 2025-07-01 RX ORDER — COLCHICINE 0.6 MG/1
0.6 TABLET ORAL DAILY
Qty: 90 TABLET | Refills: 0 | Status: SHIPPED | OUTPATIENT
Start: 2025-07-01

## 2025-07-01 RX ORDER — FERROUS SULFATE 324(65)MG
324 TABLET, DELAYED RELEASE (ENTERIC COATED) ORAL
Qty: 30 TABLET | Refills: 2 | Status: SHIPPED | OUTPATIENT
Start: 2025-07-01

## 2025-07-02 RX ORDER — COLCHICINE 0.6 MG/1
0.6 TABLET ORAL DAILY
Qty: 90 TABLET | Refills: 0 | OUTPATIENT
Start: 2025-07-02

## 2025-07-02 NOTE — TELEPHONE ENCOUNTER
Rx Refill Note  Requested Prescriptions     Pending Prescriptions Disp Refills    colchicine 0.6 MG tablet 90 tablet 0     Sig: Take 1 tablet by mouth Daily.      Last office visit with prescribing clinician: 6/6/2025   Last telemedicine visit with prescribing clinician: Visit date not found   Next office visit with prescribing clinician: 8/28/2025     Courtney Chu MA  07/02/25, 14:34 EDT    RX declined, medication was sent to the pharmacy on 07/01/25.

## 2025-07-18 RX ORDER — SEMAGLUTIDE 2.68 MG/ML
2 INJECTION, SOLUTION SUBCUTANEOUS WEEKLY
Qty: 3 ML | Refills: 5 | Status: SHIPPED | OUTPATIENT
Start: 2025-07-18

## 2025-07-18 RX ORDER — SEMAGLUTIDE 1.34 MG/ML
1 INJECTION, SOLUTION SUBCUTANEOUS WEEKLY
Qty: 3 ML | Refills: 5 | Status: CANCELLED | OUTPATIENT
Start: 2025-07-18

## 2025-08-05 RX ORDER — FERROUS SULFATE 324(65)MG
324 TABLET, DELAYED RELEASE (ENTERIC COATED) ORAL
Qty: 30 TABLET | Refills: 2 | Status: SHIPPED | OUTPATIENT
Start: 2025-08-05

## 2025-08-06 ENCOUNTER — TELEPHONE (OUTPATIENT)
Dept: SLEEP MEDICINE | Age: 35
End: 2025-08-06
Payer: MEDICAID

## 2025-08-11 RX ORDER — SEMAGLUTIDE 2.68 MG/ML
2 INJECTION, SOLUTION SUBCUTANEOUS WEEKLY
Qty: 3 ML | Refills: 5 | OUTPATIENT
Start: 2025-08-11